# Patient Record
Sex: FEMALE | Race: WHITE | NOT HISPANIC OR LATINO | Employment: FULL TIME | URBAN - METROPOLITAN AREA
[De-identification: names, ages, dates, MRNs, and addresses within clinical notes are randomized per-mention and may not be internally consistent; named-entity substitution may affect disease eponyms.]

---

## 2021-10-07 ENCOUNTER — TELEPHONE (OUTPATIENT)
Dept: OBSTETRICS AND GYNECOLOGY | Facility: CLINIC | Age: 26
End: 2021-10-07

## 2021-10-08 ENCOUNTER — INITIAL PRENATAL (OUTPATIENT)
Dept: OBSTETRICS AND GYNECOLOGY | Facility: CLINIC | Age: 26
End: 2021-10-08
Payer: COMMERCIAL

## 2021-10-08 ENCOUNTER — PROCEDURE VISIT (OUTPATIENT)
Dept: OBSTETRICS AND GYNECOLOGY | Facility: CLINIC | Age: 26
End: 2021-10-08
Payer: COMMERCIAL

## 2021-10-08 DIAGNOSIS — Z34.80 SUPERVISION OF OTHER NORMAL PREGNANCY: Primary | ICD-10-CM

## 2021-10-08 DIAGNOSIS — E03.9 HYPOTHYROID IN PREGNANCY, ANTEPARTUM: ICD-10-CM

## 2021-10-08 DIAGNOSIS — Z36.2 ENCOUNTER FOR FOLLOW-UP ULTRASOUND OF FETAL ANATOMY: ICD-10-CM

## 2021-10-08 DIAGNOSIS — E03.9 HYPOTHYROIDISM, UNSPECIFIED TYPE: ICD-10-CM

## 2021-10-08 DIAGNOSIS — M41.9 SCOLIOSIS, UNSPECIFIED SCOLIOSIS TYPE, UNSPECIFIED SPINAL REGION: ICD-10-CM

## 2021-10-08 DIAGNOSIS — O99.280 HYPOTHYROID IN PREGNANCY, ANTEPARTUM: ICD-10-CM

## 2021-10-08 DIAGNOSIS — Z32.01 POSITIVE PREGNANCY TEST: ICD-10-CM

## 2021-10-08 DIAGNOSIS — Z36.2 ENCOUNTER FOR FOLLOW-UP ULTRASOUND OF FETAL ANATOMY: Primary | ICD-10-CM

## 2021-10-08 DIAGNOSIS — Z15.89 MTHFR GENE MUTATION: ICD-10-CM

## 2021-10-08 PROCEDURE — 87086 URINE CULTURE/COLONY COUNT: CPT | Performed by: ADVANCED PRACTICE MIDWIFE

## 2021-10-08 PROCEDURE — 0500F PR INITIAL PRENATAL CARE VISIT: ICD-10-PCS | Mod: CPTII,S$GLB,, | Performed by: ADVANCED PRACTICE MIDWIFE

## 2021-10-08 PROCEDURE — 87491 CHLMYD TRACH DNA AMP PROBE: CPT | Performed by: ADVANCED PRACTICE MIDWIFE

## 2021-10-08 PROCEDURE — 76816 OB US FOLLOW-UP PER FETUS: CPT | Mod: PBBFAC | Performed by: OBSTETRICS & GYNECOLOGY

## 2021-10-08 PROCEDURE — 0500F INITIAL PRENATAL CARE VISIT: CPT | Mod: CPTII,S$GLB,, | Performed by: ADVANCED PRACTICE MIDWIFE

## 2021-10-08 PROCEDURE — 99999 PR PBB SHADOW E&M-EST. PATIENT-LVL II: ICD-10-PCS | Mod: PBBFAC,,, | Performed by: ADVANCED PRACTICE MIDWIFE

## 2021-10-08 PROCEDURE — 87591 N.GONORRHOEAE DNA AMP PROB: CPT | Performed by: ADVANCED PRACTICE MIDWIFE

## 2021-10-08 PROCEDURE — 99999 PR PBB SHADOW E&M-EST. PATIENT-LVL II: CPT | Mod: PBBFAC,,, | Performed by: ADVANCED PRACTICE MIDWIFE

## 2021-10-08 RX ORDER — COVID-19 MOLECULAR TEST ASSAY
KIT MISCELLANEOUS
COMMUNITY
Start: 2021-07-28 | End: 2022-04-12

## 2021-10-08 RX ORDER — THYROID, PORCINE 300 MG/1
1 TABLET ORAL DAILY
COMMUNITY
Start: 2021-09-13 | End: 2021-11-11 | Stop reason: SDUPTHER

## 2021-10-10 LAB
BACTERIA UR CULT: NORMAL
BACTERIA UR CULT: NORMAL

## 2021-10-11 DIAGNOSIS — E03.9 HYPOTHYROID IN PREGNANCY, ANTEPARTUM: Primary | ICD-10-CM

## 2021-10-11 DIAGNOSIS — O99.280 HYPOTHYROID IN PREGNANCY, ANTEPARTUM: Primary | ICD-10-CM

## 2021-10-12 PROBLEM — Z34.80 SUPERVISION OF OTHER NORMAL PREGNANCY: Status: ACTIVE | Noted: 2021-10-12

## 2021-10-12 PROBLEM — M41.9 SCOLIOSIS DEFORMITY OF SPINE: Status: ACTIVE | Noted: 2021-10-12

## 2021-10-12 PROBLEM — F95.9 TIC DISORDER: Status: ACTIVE | Noted: 2021-10-12

## 2021-10-12 PROBLEM — Z15.89 MTHFR GENE MUTATION: Status: ACTIVE | Noted: 2021-10-12

## 2021-10-12 PROBLEM — L85.8 KERATOSIS PILARIS: Status: ACTIVE | Noted: 2021-10-12

## 2021-10-12 PROBLEM — E03.9 HYPOTHYROIDISM: Status: ACTIVE | Noted: 2021-10-12

## 2021-10-12 LAB
C TRACH DNA SPEC QL NAA+PROBE: NOT DETECTED
N GONORRHOEA DNA SPEC QL NAA+PROBE: NOT DETECTED

## 2021-10-13 ENCOUNTER — PATIENT MESSAGE (OUTPATIENT)
Dept: ADMINISTRATIVE | Facility: OTHER | Age: 26
End: 2021-10-13
Payer: COMMERCIAL

## 2021-10-14 VITALS
BODY MASS INDEX: 23.17 KG/M2 | WEIGHT: 125.88 LBS | DIASTOLIC BLOOD PRESSURE: 62 MMHG | SYSTOLIC BLOOD PRESSURE: 100 MMHG | HEIGHT: 62 IN

## 2021-10-25 ENCOUNTER — TELEPHONE (OUTPATIENT)
Dept: OBSTETRICS AND GYNECOLOGY | Facility: CLINIC | Age: 26
End: 2021-10-25
Payer: COMMERCIAL

## 2021-11-09 ENCOUNTER — TELEPHONE (OUTPATIENT)
Dept: OBSTETRICS AND GYNECOLOGY | Facility: CLINIC | Age: 26
End: 2021-11-09
Payer: COMMERCIAL

## 2021-11-09 ENCOUNTER — LAB VISIT (OUTPATIENT)
Dept: LAB | Facility: HOSPITAL | Age: 26
End: 2021-11-09
Attending: ADVANCED PRACTICE MIDWIFE
Payer: COMMERCIAL

## 2021-11-09 DIAGNOSIS — Z32.01 POSITIVE PREGNANCY TEST: ICD-10-CM

## 2021-11-09 PROCEDURE — 85025 COMPLETE CBC W/AUTO DIFF WBC: CPT | Performed by: ADVANCED PRACTICE MIDWIFE

## 2021-11-09 PROCEDURE — 80074 ACUTE HEPATITIS PANEL: CPT | Performed by: ADVANCED PRACTICE MIDWIFE

## 2021-11-09 PROCEDURE — 87389 HIV-1 AG W/HIV-1&-2 AB AG IA: CPT | Performed by: ADVANCED PRACTICE MIDWIFE

## 2021-11-09 PROCEDURE — 86592 SYPHILIS TEST NON-TREP QUAL: CPT | Performed by: ADVANCED PRACTICE MIDWIFE

## 2021-11-09 PROCEDURE — 83036 HEMOGLOBIN GLYCOSYLATED A1C: CPT | Performed by: ADVANCED PRACTICE MIDWIFE

## 2021-11-09 PROCEDURE — 86900 BLOOD TYPING SEROLOGIC ABO: CPT | Performed by: ADVANCED PRACTICE MIDWIFE

## 2021-11-09 PROCEDURE — 36415 COLL VENOUS BLD VENIPUNCTURE: CPT | Mod: PO | Performed by: ADVANCED PRACTICE MIDWIFE

## 2021-11-10 LAB
ABO + RH BLD: NORMAL
BASOPHILS # BLD AUTO: 0.03 K/UL (ref 0–0.2)
BASOPHILS NFR BLD: 0.3 % (ref 0–1.9)
BLD GP AB SCN CELLS X3 SERPL QL: NORMAL
DIFFERENTIAL METHOD: ABNORMAL
EOSINOPHIL # BLD AUTO: 0.2 K/UL (ref 0–0.5)
EOSINOPHIL NFR BLD: 1.9 % (ref 0–8)
ERYTHROCYTE [DISTWIDTH] IN BLOOD BY AUTOMATED COUNT: 12.4 % (ref 11.5–14.5)
ESTIMATED AVG GLUCOSE: 85 MG/DL (ref 68–131)
HBA1C MFR BLD: 4.6 % (ref 4–5.6)
HCT VFR BLD AUTO: 32 % (ref 37–48.5)
HGB BLD-MCNC: 10.3 G/DL (ref 12–16)
IMM GRANULOCYTES # BLD AUTO: 0.05 K/UL (ref 0–0.04)
IMM GRANULOCYTES NFR BLD AUTO: 0.5 % (ref 0–0.5)
LYMPHOCYTES # BLD AUTO: 1.7 K/UL (ref 1–4.8)
LYMPHOCYTES NFR BLD: 18.6 % (ref 18–48)
MCH RBC QN AUTO: 29.3 PG (ref 27–31)
MCHC RBC AUTO-ENTMCNC: 32.2 G/DL (ref 32–36)
MCV RBC AUTO: 91 FL (ref 82–98)
MONOCYTES # BLD AUTO: 0.6 K/UL (ref 0.3–1)
MONOCYTES NFR BLD: 6 % (ref 4–15)
NEUTROPHILS # BLD AUTO: 6.7 K/UL (ref 1.8–7.7)
NEUTROPHILS NFR BLD: 72.7 % (ref 38–73)
NRBC BLD-RTO: 0 /100 WBC
PLATELET # BLD AUTO: 276 K/UL (ref 150–450)
PMV BLD AUTO: 11.8 FL (ref 9.2–12.9)
RBC # BLD AUTO: 3.51 M/UL (ref 4–5.4)
WBC # BLD AUTO: 9.16 K/UL (ref 3.9–12.7)

## 2021-11-11 ENCOUNTER — OFFICE VISIT (OUTPATIENT)
Dept: OBSTETRICS AND GYNECOLOGY | Facility: CLINIC | Age: 26
End: 2021-11-11
Payer: COMMERCIAL

## 2021-11-11 DIAGNOSIS — E03.9 HYPOTHYROIDISM, UNSPECIFIED TYPE: ICD-10-CM

## 2021-11-11 DIAGNOSIS — Z15.89 MTHFR GENE MUTATION: ICD-10-CM

## 2021-11-11 DIAGNOSIS — Z34.80 SUPERVISION OF OTHER NORMAL PREGNANCY: Primary | ICD-10-CM

## 2021-11-11 LAB
HAV IGM SERPL QL IA: NEGATIVE
HBV CORE IGM SERPL QL IA: NEGATIVE
HBV SURFACE AG SERPL QL IA: NEGATIVE
HCV AB SERPL QL IA: NEGATIVE
HIV 1+2 AB+HIV1 P24 AG SERPL QL IA: NEGATIVE
RPR SER QL: NORMAL

## 2021-11-11 PROCEDURE — 0502F SUBSEQUENT PRENATAL CARE: CPT | Mod: CPTII,95,, | Performed by: ADVANCED PRACTICE MIDWIFE

## 2021-11-11 PROCEDURE — 3044F PR MOST RECENT HEMOGLOBIN A1C LEVEL <7.0%: ICD-10-PCS | Mod: CPTII,95,, | Performed by: ADVANCED PRACTICE MIDWIFE

## 2021-11-11 PROCEDURE — 0502F PR SUBSEQUENT PRENATAL CARE: ICD-10-PCS | Mod: CPTII,95,, | Performed by: ADVANCED PRACTICE MIDWIFE

## 2021-11-11 PROCEDURE — 3044F HG A1C LEVEL LT 7.0%: CPT | Mod: CPTII,95,, | Performed by: ADVANCED PRACTICE MIDWIFE

## 2021-11-11 RX ORDER — THYROID, PORCINE 300 MG/1
1 TABLET ORAL DAILY
Qty: 30 TABLET | Refills: 2 | Status: SHIPPED | OUTPATIENT
Start: 2021-11-11 | End: 2022-06-05 | Stop reason: SDUPTHER

## 2021-11-14 ENCOUNTER — PATIENT MESSAGE (OUTPATIENT)
Dept: OBSTETRICS AND GYNECOLOGY | Facility: CLINIC | Age: 26
End: 2021-11-14
Payer: COMMERCIAL

## 2021-12-01 ENCOUNTER — PATIENT MESSAGE (OUTPATIENT)
Dept: ADMINISTRATIVE | Facility: OTHER | Age: 26
End: 2021-12-01
Payer: COMMERCIAL

## 2021-12-02 ENCOUNTER — OFFICE VISIT (OUTPATIENT)
Dept: OBSTETRICS AND GYNECOLOGY | Facility: CLINIC | Age: 26
End: 2021-12-02
Payer: COMMERCIAL

## 2021-12-02 DIAGNOSIS — Z34.80 SUPERVISION OF OTHER NORMAL PREGNANCY: Primary | ICD-10-CM

## 2021-12-02 PROCEDURE — 0502F SUBSEQUENT PRENATAL CARE: CPT | Mod: CPTII,95,, | Performed by: ADVANCED PRACTICE MIDWIFE

## 2021-12-02 PROCEDURE — 0502F PR SUBSEQUENT PRENATAL CARE: ICD-10-PCS | Mod: CPTII,95,, | Performed by: ADVANCED PRACTICE MIDWIFE

## 2021-12-09 ENCOUNTER — PATIENT MESSAGE (OUTPATIENT)
Dept: OBSTETRICS AND GYNECOLOGY | Facility: CLINIC | Age: 26
End: 2021-12-09
Payer: COMMERCIAL

## 2021-12-10 ENCOUNTER — PATIENT MESSAGE (OUTPATIENT)
Dept: OBSTETRICS AND GYNECOLOGY | Facility: CLINIC | Age: 26
End: 2021-12-10
Payer: COMMERCIAL

## 2021-12-15 ENCOUNTER — OFFICE VISIT (OUTPATIENT)
Dept: OBSTETRICS AND GYNECOLOGY | Facility: CLINIC | Age: 26
End: 2021-12-15
Payer: COMMERCIAL

## 2021-12-15 DIAGNOSIS — E03.9 HYPOTHYROIDISM, UNSPECIFIED TYPE: ICD-10-CM

## 2021-12-15 DIAGNOSIS — Z36.89 ENCOUNTER FOR ULTRASOUND TO ASSESS FETAL GROWTH: ICD-10-CM

## 2021-12-15 DIAGNOSIS — Z15.89 MTHFR GENE MUTATION: ICD-10-CM

## 2021-12-15 DIAGNOSIS — Z34.80 SUPERVISION OF OTHER NORMAL PREGNANCY: Primary | ICD-10-CM

## 2021-12-15 PROCEDURE — 0502F SUBSEQUENT PRENATAL CARE: CPT | Mod: CPTII,95,, | Performed by: ADVANCED PRACTICE MIDWIFE

## 2021-12-15 PROCEDURE — 0502F PR SUBSEQUENT PRENATAL CARE: ICD-10-PCS | Mod: CPTII,95,, | Performed by: ADVANCED PRACTICE MIDWIFE

## 2021-12-18 LAB
25(OH)D3 SERPL-MCNC: 100 NG/ML (ref 30–100)
COPPER RBC-MCNC: 0.81 MG/L (ref 0.53–0.91)
MAGNESIUM RBC-MCNC: 3.9 MG/DL (ref 4–6.4)
ZINC RBC-MCNC: 12.14 MG/L (ref 9–14.7)

## 2021-12-23 ENCOUNTER — TELEPHONE (OUTPATIENT)
Dept: OBSTETRICS AND GYNECOLOGY | Facility: CLINIC | Age: 26
End: 2021-12-23
Payer: COMMERCIAL

## 2021-12-24 LAB — GLUCOSE SERPL-MCNC: 73 MG/DL (ref 65–99)

## 2021-12-29 ENCOUNTER — ROUTINE PRENATAL (OUTPATIENT)
Dept: OBSTETRICS AND GYNECOLOGY | Facility: CLINIC | Age: 26
End: 2021-12-29
Payer: COMMERCIAL

## 2021-12-29 ENCOUNTER — PROCEDURE VISIT (OUTPATIENT)
Dept: OBSTETRICS AND GYNECOLOGY | Facility: CLINIC | Age: 26
End: 2021-12-29
Payer: COMMERCIAL

## 2021-12-29 VITALS — DIASTOLIC BLOOD PRESSURE: 62 MMHG | SYSTOLIC BLOOD PRESSURE: 114 MMHG | WEIGHT: 132.5 LBS | BODY MASS INDEX: 24.23 KG/M2

## 2021-12-29 DIAGNOSIS — Z36.89 ENCOUNTER FOR ULTRASOUND TO ASSESS FETAL GROWTH: ICD-10-CM

## 2021-12-29 DIAGNOSIS — Z34.80 SUPERVISION OF OTHER NORMAL PREGNANCY: Primary | ICD-10-CM

## 2021-12-29 DIAGNOSIS — E03.9 HYPOTHYROIDISM, UNSPECIFIED TYPE: ICD-10-CM

## 2021-12-29 PROCEDURE — 0502F SUBSEQUENT PRENATAL CARE: CPT | Mod: CPTII,S$GLB,, | Performed by: ADVANCED PRACTICE MIDWIFE

## 2021-12-29 PROCEDURE — 87081 CULTURE SCREEN ONLY: CPT | Performed by: ADVANCED PRACTICE MIDWIFE

## 2021-12-29 PROCEDURE — 99999 PR PBB SHADOW E&M-EST. PATIENT-LVL II: ICD-10-PCS | Mod: PBBFAC,,, | Performed by: ADVANCED PRACTICE MIDWIFE

## 2021-12-29 PROCEDURE — 99999 PR PBB SHADOW E&M-EST. PATIENT-LVL II: CPT | Mod: PBBFAC,,, | Performed by: ADVANCED PRACTICE MIDWIFE

## 2021-12-29 PROCEDURE — 76816 US OB/GYN PROCEDURE (VIEWPOINT): ICD-10-PCS | Mod: S$GLB,,, | Performed by: OBSTETRICS & GYNECOLOGY

## 2021-12-29 PROCEDURE — 0502F PR SUBSEQUENT PRENATAL CARE: ICD-10-PCS | Mod: CPTII,S$GLB,, | Performed by: ADVANCED PRACTICE MIDWIFE

## 2021-12-29 PROCEDURE — 76816 OB US FOLLOW-UP PER FETUS: CPT | Mod: S$GLB,,, | Performed by: OBSTETRICS & GYNECOLOGY

## 2022-01-03 LAB — BACTERIA SPEC AEROBE CULT: NORMAL

## 2022-01-04 ENCOUNTER — PATIENT MESSAGE (OUTPATIENT)
Dept: OBSTETRICS AND GYNECOLOGY | Facility: CLINIC | Age: 27
End: 2022-01-04
Payer: COMMERCIAL

## 2022-01-11 ENCOUNTER — OFFICE VISIT (OUTPATIENT)
Dept: OBSTETRICS AND GYNECOLOGY | Facility: CLINIC | Age: 27
End: 2022-01-11
Payer: COMMERCIAL

## 2022-01-11 DIAGNOSIS — Z34.80 SUPERVISION OF OTHER NORMAL PREGNANCY: Primary | ICD-10-CM

## 2022-01-11 PROCEDURE — 0502F SUBSEQUENT PRENATAL CARE: CPT | Mod: CPTII,95,, | Performed by: ADVANCED PRACTICE MIDWIFE

## 2022-01-11 PROCEDURE — 0502F PR SUBSEQUENT PRENATAL CARE: ICD-10-PCS | Mod: CPTII,95,, | Performed by: ADVANCED PRACTICE MIDWIFE

## 2022-01-11 NOTE — PROGRESS NOTES
The patient location is: Work  The chief complaint leading to consultation is: Routine OB Care  Visit type: audiovisual  Face to Face time with patient: 15m  20 minutes of total time spent on the encounter, which includes face to face time and non-face to face time preparing to see the patient (eg, review of tests), Obtaining and/or reviewing separately obtained history, Documenting clinical information in the electronic or other health record, Independently interpreting results (not separately reported) and communicating results to the patient/family/caregiver, or Care coordination (not separately reported).  Each patient to whom he or she provides medical services by telemedicine is:  (1) informed of the relationship between the physician and patient and the respective role of any other health care provider with respect to management of the patient; and (2) notified that he or she may decline to receive medical services by telemedicine and may withdraw from such care at any time.  Notes:   26 y.o. female  at 37w0d by US  Doing well without concerns, getting over stomach bug  Discussed GBS negative results  Leave paperwork received and will give to Savita to fill out  Reviewed warning signs, normal FM/FKCs, labor precautions and how/when to call.  RTC x 1 wks, call or present sooner prn.

## 2022-01-13 ENCOUNTER — TELEPHONE (OUTPATIENT)
Dept: PEDIATRICS | Facility: CLINIC | Age: 27
End: 2022-01-13
Payer: COMMERCIAL

## 2022-01-20 ENCOUNTER — OFFICE VISIT (OUTPATIENT)
Dept: OBSTETRICS AND GYNECOLOGY | Facility: CLINIC | Age: 27
End: 2022-01-20
Payer: COMMERCIAL

## 2022-01-20 DIAGNOSIS — Z34.80 SUPERVISION OF OTHER NORMAL PREGNANCY: Primary | ICD-10-CM

## 2022-01-20 DIAGNOSIS — E03.9 HYPOTHYROIDISM, UNSPECIFIED TYPE: ICD-10-CM

## 2022-01-20 PROCEDURE — 0502F SUBSEQUENT PRENATAL CARE: CPT | Mod: CPTII,95,, | Performed by: ADVANCED PRACTICE MIDWIFE

## 2022-01-20 PROCEDURE — 0502F PR SUBSEQUENT PRENATAL CARE: ICD-10-PCS | Mod: CPTII,95,, | Performed by: ADVANCED PRACTICE MIDWIFE

## 2022-01-20 NOTE — PROGRESS NOTES
The patient location is: Work  The chief complaint leading to consultation is: Routine OB care  Visit type: audiovisual  Face to Face time with patient: 20min  25 minutes of total time spent on the encounter, which includes face to face time and non-face to face time preparing to see the patient (eg, review of tests), Obtaining and/or reviewing separately obtained history, Documenting clinical information in the electronic or other health record, Independently interpreting results (not separately reported) and communicating results to the patient/family/caregiver, or Care coordination (not separately reported).   Each patient to whom he or she provides medical services by telemedicine is:  (1) informed of the relationship between the physician and patient and the respective role of any other health care provider with respect to management of the patient; and (2) notified that he or she may decline to receive medical services by telemedicine and may withdraw from such care at any time.  Notes:   26 y.o. female  at 38w2d by US  Doing well without concerns, discussed how to prepare for labor and hospital stay   Next visit virtual and then 40wk with ultrasound and in person visit  Reviewed warning signs, normal FM/FKCs, labor precautions and how/when to call.  RTC x 1 wks, call or present sooner prn.

## 2022-01-27 ENCOUNTER — OFFICE VISIT (OUTPATIENT)
Dept: OBSTETRICS AND GYNECOLOGY | Facility: CLINIC | Age: 27
End: 2022-01-27
Payer: COMMERCIAL

## 2022-01-27 DIAGNOSIS — Z34.80 SUPERVISION OF OTHER NORMAL PREGNANCY: Primary | ICD-10-CM

## 2022-01-27 DIAGNOSIS — E03.9 HYPOTHYROIDISM, UNSPECIFIED TYPE: ICD-10-CM

## 2022-01-27 PROCEDURE — 0502F PR SUBSEQUENT PRENATAL CARE: ICD-10-PCS | Mod: CPTII,95,, | Performed by: ADVANCED PRACTICE MIDWIFE

## 2022-01-27 PROCEDURE — 0502F SUBSEQUENT PRENATAL CARE: CPT | Mod: CPTII,95,, | Performed by: ADVANCED PRACTICE MIDWIFE

## 2022-01-27 NOTE — PROGRESS NOTES
The patient location is: Work  The chief complaint leading to consultation is: Routine OB visit  Visit type: audiovisual  Face to Face time with patient: 10min  20 minutes of total time spent on the encounter, which includes face to face time and non-face to face time preparing to see the patient (eg, review of tests), Obtaining and/or reviewing separately obtained history, Documenting clinical information in the electronic or other health record, Independently interpreting results (not separately reported) and communicating results to the patient/family/caregiver, or Care coordination (not separately reported).   Each patient to whom he or she provides medical services by telemedicine is:  (1) informed of the relationship between the physician and patient and the respective role of any other health care provider with respect to management of the patient; and (2) notified that he or she may decline to receive medical services by telemedicine and may withdraw from such care at any time.  Notes:   26 y.o. female  at 39w2d by US  Doing well without concerns, did have a run of BHC while doing carpool earlier in the week  Her Mother has covid and she was feeling under the weather today, suggestions made  Reviewed warning signs, normal FM/FKCs, labor precautions and how/when to call.  RTC x 1 wks, call or present sooner prn.   Growth and repeat GBS to be performed at [facility]

## 2022-02-01 ENCOUNTER — PATIENT MESSAGE (OUTPATIENT)
Dept: OBSTETRICS AND GYNECOLOGY | Facility: CLINIC | Age: 27
End: 2022-02-01

## 2022-02-01 ENCOUNTER — ROUTINE PRENATAL (OUTPATIENT)
Dept: OBSTETRICS AND GYNECOLOGY | Facility: CLINIC | Age: 27
End: 2022-02-01
Payer: COMMERCIAL

## 2022-02-01 VITALS
BODY MASS INDEX: 24.92 KG/M2 | DIASTOLIC BLOOD PRESSURE: 78 MMHG | WEIGHT: 136.25 LBS | SYSTOLIC BLOOD PRESSURE: 118 MMHG

## 2022-02-01 DIAGNOSIS — E03.9 HYPOTHYROIDISM, UNSPECIFIED TYPE: ICD-10-CM

## 2022-02-01 DIAGNOSIS — Z34.80 SUPERVISION OF OTHER NORMAL PREGNANCY: Primary | ICD-10-CM

## 2022-02-01 PROCEDURE — 87081 CULTURE SCREEN ONLY: CPT | Performed by: ADVANCED PRACTICE MIDWIFE

## 2022-02-01 PROCEDURE — 99999 PR PBB SHADOW E&M-EST. PATIENT-LVL III: ICD-10-PCS | Mod: PBBFAC,,, | Performed by: ADVANCED PRACTICE MIDWIFE

## 2022-02-01 PROCEDURE — 99999 PR PBB SHADOW E&M-EST. PATIENT-LVL III: CPT | Mod: PBBFAC,,, | Performed by: ADVANCED PRACTICE MIDWIFE

## 2022-02-01 PROCEDURE — 0502F SUBSEQUENT PRENATAL CARE: CPT | Mod: CPTII,S$GLB,, | Performed by: ADVANCED PRACTICE MIDWIFE

## 2022-02-01 PROCEDURE — 0502F PR SUBSEQUENT PRENATAL CARE: ICD-10-PCS | Mod: CPTII,S$GLB,, | Performed by: ADVANCED PRACTICE MIDWIFE

## 2022-02-01 NOTE — PROGRESS NOTES
26 y.o. female  at 40w0d by US  Doing well without concerns, ready for baby, mother at visit with her  /78   Wt 61.8 kg (136 lb 3.9 oz)   BMI 24.92 kg/m²   TWG: 10 lbs   Repeat GBS swab today   VE declined  Ultrasound today with cephalic presentation, anterior/grade 2 placenta, OLIVE 14.4cm, MVP 7.3cm, BPP 8/8, EFW 87%, 8lb 13oz, male gender, suspected meconium filled amniotic fluid, reviewed with pt   Discussed IOL vs expectant management, pt would like to wait for labor  Reviewed warning signs, normal FM/FKCs, labor precautions and how/when to call.  RTC x beginning of next week with BPP, call or present sooner prn.

## 2022-02-03 ENCOUNTER — HOSPITAL ENCOUNTER (INPATIENT)
Facility: HOSPITAL | Age: 27
LOS: 3 days | Discharge: HOME | End: 2022-02-06
Attending: OBSTETRICS & GYNECOLOGY | Admitting: OBSTETRICS & GYNECOLOGY
Payer: COMMERCIAL

## 2022-02-03 ENCOUNTER — PATIENT MESSAGE (OUTPATIENT)
Dept: OBSTETRICS AND GYNECOLOGY | Facility: CLINIC | Age: 27
End: 2022-02-03
Payer: COMMERCIAL

## 2022-02-03 ENCOUNTER — ANESTHESIA (OUTPATIENT)
Dept: OBSTETRICS AND GYNECOLOGY | Facility: HOSPITAL | Age: 27
End: 2022-02-03
Payer: COMMERCIAL

## 2022-02-03 ENCOUNTER — ANESTHESIA EVENT (OUTPATIENT)
Dept: OBSTETRICS AND GYNECOLOGY | Facility: HOSPITAL | Age: 27
End: 2022-02-03
Payer: COMMERCIAL

## 2022-02-03 DIAGNOSIS — O42.90 AMNIOTIC FLUID LEAKING: ICD-10-CM

## 2022-02-03 DIAGNOSIS — Z37.9 VACUUM-ASSISTED VAGINAL DELIVERY: ICD-10-CM

## 2022-02-03 LAB
ABO + RH BLD: NORMAL
BASOPHILS # BLD AUTO: 0.05 K/UL (ref 0–0.2)
BASOPHILS NFR BLD: 0.6 % (ref 0–1.9)
BLD GP AB SCN CELLS X3 SERPL QL: NORMAL
DIFFERENTIAL METHOD: ABNORMAL
EOSINOPHIL # BLD AUTO: 0 K/UL (ref 0–0.5)
EOSINOPHIL NFR BLD: 0.3 % (ref 0–8)
ERYTHROCYTE [DISTWIDTH] IN BLOOD BY AUTOMATED COUNT: 13.4 % (ref 11.5–14.5)
HCT VFR BLD AUTO: 33.4 % (ref 37–48.5)
HGB BLD-MCNC: 11 G/DL (ref 12–16)
IMM GRANULOCYTES # BLD AUTO: 0.06 K/UL (ref 0–0.04)
IMM GRANULOCYTES NFR BLD AUTO: 0.7 % (ref 0–0.5)
LYMPHOCYTES # BLD AUTO: 2.6 K/UL (ref 1–4.8)
LYMPHOCYTES NFR BLD: 28.9 % (ref 18–48)
MCH RBC QN AUTO: 27 PG (ref 27–31)
MCHC RBC AUTO-ENTMCNC: 32.9 G/DL (ref 32–36)
MCV RBC AUTO: 82 FL (ref 82–98)
MONOCYTES # BLD AUTO: 0.7 K/UL (ref 0.3–1)
MONOCYTES NFR BLD: 8.2 % (ref 4–15)
NEUTROPHILS # BLD AUTO: 5.5 K/UL (ref 1.8–7.7)
NEUTROPHILS NFR BLD: 61.3 % (ref 38–73)
NRBC BLD-RTO: 0 /100 WBC
PLATELET # BLD AUTO: 231 K/UL (ref 150–450)
PMV BLD AUTO: 12.7 FL (ref 9.2–12.9)
RBC # BLD AUTO: 4.07 M/UL (ref 4–5.4)
SARS-COV-2 RDRP RESP QL NAA+PROBE: NEGATIVE
WBC # BLD AUTO: 8.97 K/UL (ref 3.9–12.7)

## 2022-02-03 PROCEDURE — 72100002 HC LABOR CARE, 1ST 8 HOURS

## 2022-02-03 PROCEDURE — 11000001 HC ACUTE MED/SURG PRIVATE ROOM

## 2022-02-03 PROCEDURE — 63600175 PHARM REV CODE 636 W HCPCS: Performed by: NURSE ANESTHETIST, CERTIFIED REGISTERED

## 2022-02-03 PROCEDURE — U0002 COVID-19 LAB TEST NON-CDC: HCPCS | Performed by: ADVANCED PRACTICE MIDWIFE

## 2022-02-03 PROCEDURE — 86850 RBC ANTIBODY SCREEN: CPT | Performed by: ADVANCED PRACTICE MIDWIFE

## 2022-02-03 PROCEDURE — 62326 NJX INTERLAMINAR LMBR/SAC: CPT | Performed by: NURSE ANESTHETIST, CERTIFIED REGISTERED

## 2022-02-03 PROCEDURE — 85025 COMPLETE CBC W/AUTO DIFF WBC: CPT | Performed by: ADVANCED PRACTICE MIDWIFE

## 2022-02-03 PROCEDURE — 25000003 PHARM REV CODE 250: Performed by: NURSE ANESTHETIST, CERTIFIED REGISTERED

## 2022-02-03 PROCEDURE — C1751 CATH, INF, PER/CENT/MIDLINE: HCPCS | Performed by: ANESTHESIOLOGY

## 2022-02-03 RX ORDER — LIDOCAINE HYDROCHLORIDE AND EPINEPHRINE 15; 5 MG/ML; UG/ML
INJECTION, SOLUTION EPIDURAL
Status: DISCONTINUED | OUTPATIENT
Start: 2022-02-03 | End: 2022-02-04

## 2022-02-03 RX ORDER — ROPIVACAINE HYDROCHLORIDE 2 MG/ML
INJECTION, SOLUTION EPIDURAL; INFILTRATION; PERINEURAL
Status: DISCONTINUED | OUTPATIENT
Start: 2022-02-03 | End: 2022-02-04

## 2022-02-03 RX ADMIN — ROPIVACAINE HYDROCHLORIDE 4 ML: 2 INJECTION, SOLUTION EPIDURAL; INFILTRATION at 11:02

## 2022-02-03 RX ADMIN — ROPIVACAINE HYDROCHLORIDE 14 ML/HR: 2 INJECTION, SOLUTION EPIDURAL; INFILTRATION at 11:02

## 2022-02-03 RX ADMIN — LIDOCAINE HYDROCHLORIDE,EPINEPHRINE BITARTRATE 3 ML: 15; .005 INJECTION, SOLUTION EPIDURAL; INFILTRATION; INTRACAUDAL; PERINEURAL at 11:02

## 2022-02-04 PROBLEM — Z37.9 VACUUM-ASSISTED VAGINAL DELIVERY: Status: ACTIVE | Noted: 2022-02-04

## 2022-02-04 PROBLEM — O42.90 AMNIOTIC FLUID LEAKING: Status: RESOLVED | Noted: 2022-02-04 | Resolved: 2022-02-04

## 2022-02-04 PROBLEM — Z34.80 SUPERVISION OF OTHER NORMAL PREGNANCY: Status: RESOLVED | Noted: 2021-10-12 | Resolved: 2022-02-04

## 2022-02-04 PROBLEM — O42.90 AMNIOTIC FLUID LEAKING: Status: ACTIVE | Noted: 2022-02-04

## 2022-02-04 LAB — BACTERIA SPEC AEROBE CULT: NORMAL

## 2022-02-04 PROCEDURE — 63600175 PHARM REV CODE 636 W HCPCS: Performed by: ADVANCED PRACTICE MIDWIFE

## 2022-02-04 PROCEDURE — 27201127 HC VACUUM EXTRACTOR

## 2022-02-04 PROCEDURE — 25000003 PHARM REV CODE 250: Performed by: MIDWIFE

## 2022-02-04 PROCEDURE — 63600175 PHARM REV CODE 636 W HCPCS: Performed by: OBSTETRICS & GYNECOLOGY

## 2022-02-04 PROCEDURE — 59400 OBSTETRICAL CARE: CPT | Mod: GB,,, | Performed by: OBSTETRICS & GYNECOLOGY

## 2022-02-04 PROCEDURE — 51701 INSERT BLADDER CATHETER: CPT

## 2022-02-04 PROCEDURE — 59400 PR FULL ROUT OBSTE CARE,VAGINAL DELIV: ICD-10-PCS | Mod: GB,,, | Performed by: OBSTETRICS & GYNECOLOGY

## 2022-02-04 PROCEDURE — 72100003 HC LABOR CARE, EA. ADDL. 8 HRS

## 2022-02-04 PROCEDURE — 86762 RUBELLA ANTIBODY: CPT | Performed by: ADVANCED PRACTICE MIDWIFE

## 2022-02-04 PROCEDURE — 63600175 PHARM REV CODE 636 W HCPCS: Performed by: MIDWIFE

## 2022-02-04 PROCEDURE — 72200006 HC VAGINAL DELIVERY LEVEL III

## 2022-02-04 PROCEDURE — 11000001 HC ACUTE MED/SURG PRIVATE ROOM

## 2022-02-04 RX ORDER — CALCIUM CARBONATE 200(500)MG
500 TABLET,CHEWABLE ORAL 3 TIMES DAILY PRN
Status: DISCONTINUED | OUTPATIENT
Start: 2022-02-04 | End: 2022-02-04

## 2022-02-04 RX ORDER — OXYTOCIN/RINGER'S LACTATE 30/500 ML
0-30 PLASTIC BAG, INJECTION (ML) INTRAVENOUS CONTINUOUS
Status: DISCONTINUED | OUTPATIENT
Start: 2022-02-04 | End: 2022-02-04

## 2022-02-04 RX ORDER — PROCHLORPERAZINE EDISYLATE 5 MG/ML
5 INJECTION INTRAMUSCULAR; INTRAVENOUS EVERY 6 HOURS PRN
Status: DISCONTINUED | OUTPATIENT
Start: 2022-02-04 | End: 2022-02-06 | Stop reason: HOSPADM

## 2022-02-04 RX ORDER — OXYTOCIN/RINGER'S LACTATE 30/500 ML
95 PLASTIC BAG, INJECTION (ML) INTRAVENOUS ONCE
Status: DISCONTINUED | OUTPATIENT
Start: 2022-02-04 | End: 2022-02-06 | Stop reason: HOSPADM

## 2022-02-04 RX ORDER — SODIUM CHLORIDE, SODIUM LACTATE, POTASSIUM CHLORIDE, CALCIUM CHLORIDE 600; 310; 30; 20 MG/100ML; MG/100ML; MG/100ML; MG/100ML
INJECTION, SOLUTION INTRAVENOUS CONTINUOUS
Status: DISCONTINUED | OUTPATIENT
Start: 2022-02-04 | End: 2022-02-04

## 2022-02-04 RX ORDER — TRANEXAMIC ACID 100 MG/ML
1000 INJECTION, SOLUTION INTRAVENOUS ONCE AS NEEDED
Status: DISCONTINUED | OUTPATIENT
Start: 2022-02-04 | End: 2022-02-04

## 2022-02-04 RX ORDER — ONDANSETRON 8 MG/1
8 TABLET, ORALLY DISINTEGRATING ORAL EVERY 8 HOURS PRN
Status: DISCONTINUED | OUTPATIENT
Start: 2022-02-04 | End: 2022-02-06 | Stop reason: HOSPADM

## 2022-02-04 RX ORDER — MISOPROSTOL 200 UG/1
800 TABLET ORAL
Status: DISCONTINUED | OUTPATIENT
Start: 2022-02-04 | End: 2022-02-06 | Stop reason: HOSPADM

## 2022-02-04 RX ORDER — OXYTOCIN/RINGER'S LACTATE 30/500 ML
334 PLASTIC BAG, INJECTION (ML) INTRAVENOUS ONCE
Status: DISCONTINUED | OUTPATIENT
Start: 2022-02-04 | End: 2022-02-04

## 2022-02-04 RX ORDER — OXYTOCIN 10 [USP'U]/ML
10 INJECTION, SOLUTION INTRAMUSCULAR; INTRAVENOUS ONCE
Status: COMPLETED | OUTPATIENT
Start: 2022-02-04 | End: 2022-02-04

## 2022-02-04 RX ORDER — DIPHENOXYLATE HYDROCHLORIDE AND ATROPINE SULFATE 2.5; .025 MG/1; MG/1
1 TABLET ORAL 4 TIMES DAILY PRN
Status: DISCONTINUED | OUTPATIENT
Start: 2022-02-04 | End: 2022-02-06 | Stop reason: HOSPADM

## 2022-02-04 RX ORDER — DIPHENHYDRAMINE HYDROCHLORIDE 50 MG/ML
25 INJECTION INTRAMUSCULAR; INTRAVENOUS EVERY 4 HOURS PRN
Status: DISCONTINUED | OUTPATIENT
Start: 2022-02-04 | End: 2022-02-06 | Stop reason: HOSPADM

## 2022-02-04 RX ORDER — PROCHLORPERAZINE EDISYLATE 5 MG/ML
5 INJECTION INTRAMUSCULAR; INTRAVENOUS EVERY 6 HOURS PRN
Status: DISCONTINUED | OUTPATIENT
Start: 2022-02-04 | End: 2022-02-04

## 2022-02-04 RX ORDER — HYDROCORTISONE 25 MG/G
CREAM TOPICAL 3 TIMES DAILY PRN
Status: DISCONTINUED | OUTPATIENT
Start: 2022-02-04 | End: 2022-02-06 | Stop reason: HOSPADM

## 2022-02-04 RX ORDER — PRENATAL WITH FERROUS FUM AND FOLIC ACID 3080; 920; 120; 400; 22; 1.84; 3; 20; 10; 1; 12; 200; 27; 25; 2 [IU]/1; [IU]/1; MG/1; [IU]/1; MG/1; MG/1; MG/1; MG/1; MG/1; MG/1; UG/1; MG/1; MG/1; MG/1; MG/1
1 TABLET ORAL DAILY
Status: DISCONTINUED | OUTPATIENT
Start: 2022-02-04 | End: 2022-02-06 | Stop reason: HOSPADM

## 2022-02-04 RX ORDER — SODIUM CHLORIDE 0.9 % (FLUSH) 0.9 %
10 SYRINGE (ML) INJECTION
Status: DISCONTINUED | OUTPATIENT
Start: 2022-02-04 | End: 2022-02-06 | Stop reason: HOSPADM

## 2022-02-04 RX ORDER — METHYLERGONOVINE MALEATE 0.2 MG/ML
200 INJECTION INTRAVENOUS
Status: DISCONTINUED | OUTPATIENT
Start: 2022-02-04 | End: 2022-02-06 | Stop reason: HOSPADM

## 2022-02-04 RX ORDER — ACETAMINOPHEN 325 MG/1
650 TABLET ORAL EVERY 6 HOURS PRN
Status: DISCONTINUED | OUTPATIENT
Start: 2022-02-04 | End: 2022-02-06 | Stop reason: HOSPADM

## 2022-02-04 RX ORDER — HYDROCODONE BITARTRATE AND ACETAMINOPHEN 5; 325 MG/1; MG/1
1 TABLET ORAL EVERY 4 HOURS PRN
Status: DISCONTINUED | OUTPATIENT
Start: 2022-02-04 | End: 2022-02-06 | Stop reason: HOSPADM

## 2022-02-04 RX ORDER — IBUPROFEN 600 MG/1
600 TABLET ORAL EVERY 6 HOURS
Status: DISCONTINUED | OUTPATIENT
Start: 2022-02-04 | End: 2022-02-06 | Stop reason: HOSPADM

## 2022-02-04 RX ORDER — OXYTOCIN/RINGER'S LACTATE 30/500 ML
95 PLASTIC BAG, INJECTION (ML) INTRAVENOUS ONCE
Status: DISCONTINUED | OUTPATIENT
Start: 2022-02-04 | End: 2022-02-04

## 2022-02-04 RX ORDER — SIMETHICONE 80 MG
1 TABLET,CHEWABLE ORAL 4 TIMES DAILY PRN
Status: DISCONTINUED | OUTPATIENT
Start: 2022-02-04 | End: 2022-02-04

## 2022-02-04 RX ORDER — ONDANSETRON 8 MG/1
8 TABLET, ORALLY DISINTEGRATING ORAL EVERY 8 HOURS PRN
Status: DISCONTINUED | OUTPATIENT
Start: 2022-02-04 | End: 2022-02-04

## 2022-02-04 RX ORDER — DOCUSATE SODIUM 100 MG/1
200 CAPSULE, LIQUID FILLED ORAL 2 TIMES DAILY PRN
Status: DISCONTINUED | OUTPATIENT
Start: 2022-02-04 | End: 2022-02-06 | Stop reason: HOSPADM

## 2022-02-04 RX ORDER — CARBOPROST TROMETHAMINE 250 UG/ML
250 INJECTION, SOLUTION INTRAMUSCULAR
Status: DISCONTINUED | OUTPATIENT
Start: 2022-02-04 | End: 2022-02-06 | Stop reason: HOSPADM

## 2022-02-04 RX ORDER — THYROID 30 MG/1
150 TABLET ORAL
Status: DISCONTINUED | OUTPATIENT
Start: 2022-02-05 | End: 2022-02-06 | Stop reason: HOSPADM

## 2022-02-04 RX ORDER — SIMETHICONE 80 MG
1 TABLET,CHEWABLE ORAL EVERY 6 HOURS PRN
Status: DISCONTINUED | OUTPATIENT
Start: 2022-02-04 | End: 2022-02-06 | Stop reason: HOSPADM

## 2022-02-04 RX ORDER — DIPHENHYDRAMINE HCL 25 MG
25 CAPSULE ORAL EVERY 4 HOURS PRN
Status: DISCONTINUED | OUTPATIENT
Start: 2022-02-04 | End: 2022-02-06 | Stop reason: HOSPADM

## 2022-02-04 RX ADMIN — Medication 2 MILLI-UNITS/MIN: at 07:02

## 2022-02-04 RX ADMIN — IBUPROFEN 600 MG: 600 TABLET, FILM COATED ORAL at 11:02

## 2022-02-04 RX ADMIN — HYDROCODONE BITARTRATE AND ACETAMINOPHEN 1 TABLET: 5; 325 TABLET ORAL at 08:02

## 2022-02-04 RX ADMIN — OXYTOCIN 10 UNITS: 10 INJECTION, SOLUTION INTRAMUSCULAR; INTRAVENOUS at 10:02

## 2022-02-04 RX ADMIN — SODIUM CHLORIDE, SODIUM LACTATE, POTASSIUM CHLORIDE, AND CALCIUM CHLORIDE: .6; .31; .03; .02 INJECTION, SOLUTION INTRAVENOUS at 07:02

## 2022-02-04 RX ADMIN — HYDROCODONE BITARTRATE AND ACETAMINOPHEN 1 TABLET: 5; 325 TABLET ORAL at 04:02

## 2022-02-04 RX ADMIN — IBUPROFEN 600 MG: 600 TABLET, FILM COATED ORAL at 05:02

## 2022-02-04 NOTE — LACTATION NOTE
This note was copied from a baby's chart.  Lactation Rounds.    Mother reclined with infant skin to skin on her chest, primary nurse at bedside for extended transition.  Infant has increased RR, is showing hunger cues.  Assisted mother to bring infant to L breast where infant mouths and licks and continues to show hunger cues.  Demo/return demo of breast shaping and optimal maternal hand positioning. Infant latches and sustains a nutritive pattern of sucking x60 seconds before resting at the breast.      Discussed early feeding cues and encouraged mother to feed baby in response to those cues. Encouraged unrestricted feedings rather than timed/amount limits, procedural schedules, or visitation schedules. Reviewed normal feeding expectations of 8 or more feedings per 24 hour period, cues that babies use to signal hunger and satiety, and the importance of physical contact during feeding. Advised that this will be dependent on infant condition.    Mother was taught hand expression of breastmilk/colostrum. She was instructed to:   Sit upright and lean forward, if possible.   When feasible, apply warm, wet compress over breasts for a few minutes.    Perform gentle breast massage.   Form a C with her hand and place it about 1 inch back from the areola with the nipple centered between her index finger and her thumb.   Press, compress, relax:  Using her finger and thumb, apply pressure in an inward direction toward the breast without stretching the tissue, compress the breast tissue between her finger and thumb, then relax her finger and thumb. Repeat process for a few minutes.   Rotate placement of finger and thumb on the breasts to facilitate emptying.   Collect expressed breastmilk/colostrum with a spoon or cup and feed immediately to the baby, if able.   If unable to feed immediately, place breastmilk/colostrum directly into a sterile storage container for later use. Place the babys breast milk label (with  the date and time of collection and the names of mother's medications) on the container. Reviewed proper handling and storage of expressed breastmilk.   Patient effectively return demonstrated and verbalized understanding.

## 2022-02-04 NOTE — ANESTHESIA PROCEDURE NOTES
Epidural    Patient location during procedure: OB   Reason for block: primary anesthetic   Reason for block: labor analgesia requested by patient and obstetrician  Diagnosis: IUP Labor   Start time: 2/3/2022 11:01 PM  Timeout: 2/3/2022 11:00 PM  End time: 2/3/2022 11:16 PM    Staffing  Performing Provider: Chris Wing CRNA  Authorizing Provider: Soila Liang MD        Preanesthetic Checklist  Completed: patient identified, IV checked, risks and benefits discussed, monitors and equipment checked, pre-op evaluation, timeout performed, anesthesia consent given, hand hygiene performed and patient being monitored  Preparation  Patient position: sitting  Prep: Betadine  Patient monitoring: Pulse Ox and Blood Pressure  Reason for block: primary anesthetic   Epidural  Skin Anesthetic: lidocaine 1%  Skin Wheal: 2 mL  Administration type: continuous  Approach: midline  Interspace: L3-4    Injection technique: TABITHA air  Needle and Epidural Catheter  Needle type: Tuohy   Needle gauge: 17  Needle length: 3.5 inches  Needle insertion depth: 4 cm  Catheter type: springwound and multi-orifice  Catheter size: 19 G  Insertion Attempts: 1  Test dose: 3 mL of lidocaine 1.5% with Epi 1-to-200,000  Additional Documentation: incremental injection, negative aspiration for heme and CSF, no paresthesia on injection, no signs/symptoms of IV or SA injection, no significant complaints from patient and no significant pain on injection  Needle localization: anatomical landmarks  Assessment  Ease of block: easy  Patient's tolerance of the procedure: comfortable throughout block and no complaintsNo inadvertent dural puncture with Tuohy.  Dural puncture not performed with spinal needle

## 2022-02-04 NOTE — ANESTHESIA POSTPROCEDURE EVALUATION
Anesthesia Post Evaluation    Patient: Marlene Garcia    Procedure(s) Performed: * No procedures listed *    Final Anesthesia Type: epidural      Patient location during evaluation: labor & delivery  Patient participation: Yes- Able to Participate  Level of consciousness: awake and alert and oriented  Post-procedure vital signs: reviewed and stable  Pain management: adequate  Airway patency: patent    PONV status at discharge: No PONV  Anesthetic complications: no      Cardiovascular status: blood pressure returned to baseline, stable and hemodynamically stable  Respiratory status: unassisted, room air and spontaneous ventilation  Hydration status: euvolemic  Follow-up not needed.          Vitals Value Taken Time   /69 02/04/22 1250   Temp 37.1 °C (98.7 °F) 02/04/22 1030   Pulse 114 02/04/22 1250   Resp 14 02/04/22 1405   SpO2 98 % 02/04/22 1249   Vitals shown include unvalidated device data.      No case tracking events are documented in the log.      Pain/Jaci Score: Pain Rating Prior to Med Admin: 4 (2/4/2022 11:43 AM)  Pain Rating Post Med Admin: 2 (2/4/2022 12:30 PM)  Jaci Score: 10 (2/4/2022 12:30 PM)

## 2022-02-04 NOTE — PLAN OF CARE
[facility] - Labor & Delivery  Discharge Assessment    Primary Care Provider: Primary Doctor No     OB Screen (most recent)       OB Screen - 22 1301          OB SCREEN    Assessment Type Discharge Planning Assessment (P)      Source of Information health record (P)      Received Prenatal Care Yes (P)      Any indications/suspicions for None (P)      Is this a teen pregnancy No (P)      Is the baby in NICU No (P)      Indication for adoption/[facility] No (P)      Indication for DME/post-acute needs No (P)      HIV (+) No (P)      Any congenital  disorders No (P)      Fetal demise/ death No (P)

## 2022-02-04 NOTE — H&P
[facility] - Labor & Delivery  Obstetrics  History & Physical    Patient Name: Marlene Garcia  MRN: [id number]  Admission Date: 2/3/2022  Primary Care Provider: Primary Doctor No    Subjective:     Principal Problem:Amniotic fluid leaking    History of Present Illness:  Presents with gross ROM      Obstetric HPI:  Patient reports  contractions, active fetal movement, No vaginal bleeding , Yes loss of fluid     This pregnancy has been complicated by post dates, hypothyroidism, MTHFR gene mutation, scoliosis    OB History    Para Term  AB Living   1 0 0 0 0 0   SAB IAB Ectopic Multiple Live Births   0 0 0 0 0      # Outcome Date GA Lbr Presley/2nd Weight Sex Delivery Anes PTL Lv   1 Current              Past Medical History:   Diagnosis Date    Thyroid disease      Past Surgical History:   Procedure Laterality Date    ORAL MUCOCELE EXCISION         PTA Medications   Medication Sig    ARMOUR THYROID 300 mg Tab Take 1 tablet by mouth once daily.    ID NOW COVID-19 TEST KIT Kit AS DIRECTED       Review of patient's allergies indicates:   Allergen Reactions    Pcn [penicillins]         Family History    None       Tobacco Use    Smoking status: Never Smoker    Smokeless tobacco: Never Used   Substance and Sexual Activity    Alcohol use: Not Currently    Drug use: Never    Sexual activity: Yes     Partners: Male     Birth control/protection: None     Review of Systems   Gastrointestinal: Positive for abdominal pain.   Genitourinary: Positive for vaginal discharge.        Clear amniotic fluid   All other systems reviewed and are negative.     Objective:     Vital Signs (Most Recent):  Pulse: 79 (22 0020)  BP: 115/74 (22 0020) Vital Signs (24h Range):  Pulse:  [] 79  BP: (111-126)/(56-97) 115/74        There is no height or weight on file to calculate BMI.    FHT: Cat 1 (reassuring)  TOCO:  Q 3-4  minutes    Physical Exam:   Constitutional: She is oriented to person, place, and  time. She appears well-developed and well-nourished.        Pulmonary/Chest: Effort normal.        Abdominal: Soft.     Genitourinary:    Vagina and uterus normal.             Musculoskeletal: Normal range of motion and moves all extremeties.       Neurological: She is alert and oriented to person, place, and time.    Skin: Skin is warm and dry.    Psychiatric: She has a normal mood and affect. Her behavior is normal. Judgment and thought content normal.       Cervix:  Dilation:  7  Effacement:  90  Station: -2  Presentation: Vertex     Significant Labs:  Lab Results   Component Value Date    GROUPTRH A POS 2022    HEPBSAG Negative 2021    STREPBCULT Normal cervicovaginal james present 2022       I have personallly reviewed all pertinent lab results from the last 24 hours.    Assessment/Plan:     26 y.o. female  at 40w3d for:    * Amniotic fluid leaking  Admit for labor management  Anticipate         Tigist Cleaning CNM  Obstetrics  [facility] - Labor & Delivery

## 2022-02-04 NOTE — ANESTHESIA PREPROCEDURE EVALUATION
02/03/2022  Marlene Garcia is a 26 y.o., female.    Anesthesia Evaluation    I have reviewed the Patient Summary Reports.    I have reviewed the Nursing Notes.    I have reviewed the Medications.     Review of Systems  Anesthesia Hx:  Denies Family Hx of Anesthesia complications.   Denies Personal Hx of Anesthesia complications.   Social:  Non-Smoker    Hematology/Oncology:  Hematology Normal   Oncology Normal     EENT/Dental:EENT/Dental Normal   Cardiovascular:  Cardiovascular Normal     Pulmonary:  Pulmonary Normal    Renal/:  Renal/ Normal     Hepatic/GI:  Hepatic/GI Normal    Musculoskeletal:   scoliosis   Neurological:  Neurology Normal    Endocrine:   Hypothyroidism    Psych:   Tic disorder         Physical Exam  General:  Well nourished    Airway/Jaw/Neck:  Airway Findings: Mouth Opening: Normal Tongue: Normal  General Airway Assessment: Adult  Mallampati: II  TM Distance: Normal, at least 6 cm      Dental:  Dental Findings: In tact        Mental Status:  Mental Status Findings:  Cooperative, Alert and Oriented         Anesthesia Plan  Type of Anesthesia, risks & benefits discussed:  Anesthesia Type:  epidural    Patient's Preference:   Plan Factors:          Intra-op Monitoring Plan:   Intra-op Monitoring Plan Comments:   Post Op Pain Control Plan:   Post Op Pain Control Plan Comments:     Induction:    Beta Blocker:         Informed Consent: Patient understands risks and agrees with Anesthesia plan.  Questions answered. Anesthesia consent signed with patient.  ASA Score: 2     Day of Surgery Review of History & Physical:            Ready For Surgery From Anesthesia Perspective.

## 2022-02-04 NOTE — SUBJECTIVE & OBJECTIVE
Interval History:  Marlene is a 26 y.o.  at 40w3d. She is doing well.     Objective:     Vital Signs (Most Recent):  Pulse: 80 (22)  BP: 109/78 (22) Vital Signs (24h Range):  Pulse:  [] 80  BP: ()/(51-97) 109/78        There is no height or weight on file to calculate BMI.    FHT: Cat 1 (reassuring)  TOCO:  Q 2.5-4 minutes    Cervical Exam:  Dilation:  10  Effacement:  100%  Station: 2  Presentation: Vertex     Significant Labs:  Lab Results   Component Value Date    GROUPTRH A POS 2022    HEPBSAG Negative 2021    STREPBCULT Normal cervicovaginal james present 2022       I have personallly reviewed all pertinent lab results from the last 24 hours.  Recent Lab Results       22        Baso #   0.05       Basophil %   0.6       Differential Method   Automated       Eos #   0.0       Eosinophil %   0.3       Gran # (ANC)   5.5       Gran %   61.3       Group & Rh   A POS       Hematocrit   33.4       Hemoglobin   11.0       Immature Grans (Abs)   0.06  Comment: Mild elevation in immature granulocytes is non specific and   can be seen in a variety of conditions including stress response,   acute inflammation, trauma and pregnancy. Correlation with other   laboratory and clinical findings is essential.         Immature Granulocytes   0.7       INDIRECT NICOLE   NEG       Lymph #   2.6       Lymph %   28.9       MCH   27.0       MCHC   32.9       MCV   82       Mono #   0.7       Mono %   8.2       MPV   12.7       nRBC   0       Platelets   231       RBC   4.07       RDW   13.4       SARS-CoV-2 RNA, Amplification, Qual Negative  Comment: This test utilizes isothermal nucleic acid amplification   technology to detect the SARS-CoV-2 RdRp nucleic acid segment.   The analytical sensitivity (limit of detection) is 125 genome   equivalents/mL.     A POSITIVE result implies infection with the SARS-CoV-2 virus;  the patient is presumed to be  contagious.    A NEGATIVE result means that SARS-CoV-2 nucleic acids are not  present above the limit of detection. A NEGATIVE result should be   treated as presumptive. It does not rule out the possibility of   COVID-19 and should not be the sole basis for treatment decisions.   If COVID-19 is strongly suspected based on clinical and exposure   history, re-testing using an alternate molecular assay should be   considered.       This test is only for use under the Food and Drug   Administration s Emergency Use Authorization (EUA).   Commercial kits are provided by [employer].   Performance characteristics of the EUA have been independently  verified by [facility] Department of  Pathology and Laboratory Medicine.   _________________________________________________________________  The ID NOW COVID-19 Letter of Authorization, along with the   authorized Fact Sheet for Healthcare Providers, the authorized Fact  Sheet for Patients, and authorized labeling are available on the FDA   website:  www.fda.gov/MedicalDevices/Safety/EmergencySituations/lio621499.htm           WBC   8.97             Physical Exam:   Constitutional: She is oriented to person, place, and time. She appears well-developed and well-nourished.    HENT:   Head: Normocephalic.       Pulmonary/Chest: Effort normal.        Abdominal: Soft.   gravid             Musculoskeletal: Normal range of motion.      Comments: ROM limited by PABLO       Neurological: She is alert and oriented to person, place, and time.    Skin: Skin is warm and dry. Capillary refill takes less than 2 seconds.    Psychiatric: She has a normal mood and affect. Her behavior is normal. Judgment and thought content normal.

## 2022-02-04 NOTE — L&D DELIVERY NOTE
[facility] - Labor & Delivery  Vaginal Delivery   Operative Note    SUMMARY     Vacuum assisted vaginal delivery of live infant, (use of mushroom x 2--1 pull with 2 contractions and bell--1 pull with 2 contractions--pop off when bell and mushroom reapplied; vacuum brought head to +3 ; vertex delivered under moms effort over 2nd degree tear.;  was placed on mothers abdomen for skin to skin and bulb suctioning performed.  Infant delivered position OA over 2nd degree tear.  Nuchal cord: x3;  Yes, cord reduced at perineum.    Spontaneous delivery of placenta and IM pitocin given noting good uterine tone.  2nd degree laceration noted and repaired in normal fashion with 2-0 vicrl reinforcement of capsule and 3-0 chromic.  Bilateral   Sulcal tears repaired with 3-0 chromic  Patient tolerated delivery well. Sponge needle and lap counted correctly x2.    Indications: Amniotic fluid leaking  Pregnancy complicated by:   Patient Active Problem List   Diagnosis    Tic disorder    Keratosis pilaris    Hypothyroidism    Supervision of other normal pregnancy    Scoliosis deformity of spine    MTHFR gene mutation    Amniotic fluid leaking     Admitting GA: 40w3d    Delivery Information for Demian Garcia    Birth information:  YOB: 2024   Time of birth: 10:03 AM   Sex: male   Head Delivery Date/Time: 2/4/2022 10:03 AM   Delivery type: Vaginal, Spontaneous   Gestational Age: 40w3d    Delivery Providers    Delivering clinician: Adrienne Siddiqui MD   Provider Role    Rose Chisholm, RN Registered Nurse    Jmaila Saavedra, RN Registered Nurse    Raul Portillo CNM Midwife    Karmen Rogers [facility]            Measurements    Weight:   Length:          Apgars    Living status:   Apgars:  1 min.:  5 min.:  10 min.:  15 min.:  20 min.:    Skin color:         Heart rate:         Reflex irritability:         Muscle tone:         Respiratory effort:         Total:                    Shoulder Dystocia     Shoulder dystocia present?: No           Presentation    Presentation: Vertex           Interventions/Resuscitation    Method: Bulb Suctioning, Tactile Stimulation, Deep Suctioning       Cord    Vessels: 3 vessels  Complications: Nuchal  Nuchal Intervention: reduced  Nuchal Cord Description: loose nuchal cord  Number of Loops: 3  Delayed Cord Clamping?: Yes  Cord Clamped Date/Time: 2022 10:05 AM  Cord Blood Disposition: Discarded  Gases Sent?: No  Stem Cell Collection (by MD): No       Placenta    Placenta delivery date/time:   Placenta removal:            Labor Events:       labor: No     Labor Onset Date/Time:         Dilation Complete Date/Time:         Start Pushing Date/Time:         Start Pushing Date/Time:       Rupture Date/Time: 22           Rupture type:          Fluid Amount:       Fluid Color: Clear      Fluid Odor:       Membrane Status: SRM (Spontaneous Rupture)               steroids: None     Antibiotics given for GBS: No     Induction: none     Indications for induction:        Augmentation:       Indications for augmentation:       Labor complications: None     Additional complications:          Cervical ripening:                     Delivery:      Episiotomy: None     Indication for Episiotomy:       Perineal Lacerations:   Repaired:      Periurethral Laceration:   Repaired:     Labial Laceration:   Repaired:     Sulcus Laceration:   Repaired:     Vaginal Laceration:   Repaired:     Cervical Laceration:   Repaired:     Repair suture:       Repair # of packets:       Last Value - EBL - Nursing (mL):       Sum - EBL - Nursing (mL): 0     Last Value - EBL - Anesthesia (mL):      Calculated QBL (mL):       Vaginal Sweep Performed:       Surgicount Correct:         Other providers:       Anesthesia    Method: Epidural          Details (if applicable):  Trial of Labor      Categorization:      Priority:     Indications for :     Incision  Type:       Additional  information:  Forceps:    Vacuum:    Breech:    Observed anomalies    Other (Comments):

## 2022-02-04 NOTE — PROGRESS NOTES
S: comfortable with epidural  O:  VS reviewed, afebrile   Vitals:    02/04/22 0405 02/04/22 0420 02/04/22 0435 02/04/22 0450   BP: 114/65 (!) 107/56 126/80 120/82   Pulse: 94  74 92       FHTs : CAT 1 reassuring, moderate variability  UC:  Q 3  SVE:  C/C/V/0    A: IUP @ 40w3d ;     Patient Active Problem List   Diagnosis    Tic disorder    Keratosis pilaris    Hypothyroidism    Supervision of other normal pregnancy    Scoliosis deformity of spine    MTHFR gene mutation    Amniotic fluid leaking       P: Post pushing x's 30 minutes with little progress, will labor down  Continue close monitoring of maternal/fetal status

## 2022-02-04 NOTE — PROGRESS NOTES
[facility]  Obstetrics  Labor Progress Note    Patient Name: Marlene Garcia  MRN: [id number]  Admission Date: 2/3/2022  Hospital Length of Stay: 1 days  Attending Physician: Sylvia Pratt MD  Primary Care Provider: Primary Doctor No    Subjective:     Principal Problem:Amniotic fluid leaking    Hospital Course:  Admit for labor management  PABLO in place  Pitocin augmentation      Interval History:  Marlene is a 26 y.o.  at 40w3d. She is doing well.     Objective:     Vital Signs (Most Recent):  Pulse: 80 (22)  BP: 109/78 (22) Vital Signs (24h Range):  Pulse:  [] 80  BP: ()/(51-97) 109/78        There is no height or weight on file to calculate BMI.    FHT: Cat 1 (reassuring)  TOCO:  Q 2.5-4 minutes    Cervical Exam:  Dilation:  10  Effacement:  100%  Station: 2  Presentation: Vertex     Significant Labs:  Lab Results   Component Value Date    GROUPTRH A POS 2022    HEPBSAG Negative 2021    STREPBCULT Normal cervicovaginal james present 2022       I have personallly reviewed all pertinent lab results from the last 24 hours.  Recent Lab Results       22        Baso #   0.05       Basophil %   0.6       Differential Method   Automated       Eos #   0.0       Eosinophil %   0.3       Gran # (ANC)   5.5       Gran %   61.3       Group & Rh   A POS       Hematocrit   33.4       Hemoglobin   11.0       Immature Grans (Abs)   0.06  Comment: Mild elevation in immature granulocytes is non specific and   can be seen in a variety of conditions including stress response,   acute inflammation, trauma and pregnancy. Correlation with other   laboratory and clinical findings is essential.         Immature Granulocytes   0.7       INDIRECT NICOLE   NEG       Lymph #   2.6       Lymph %   28.9       MCH   27.0       MCHC   32.9       MCV   82       Mono #   0.7       Mono %   8.2       MPV   12.7       nRBC   0        Platelets   231       RBC   4.07       RDW   13.4       SARS-CoV-2 RNA, Amplification, Qual Negative  Comment: This test utilizes isothermal nucleic acid amplification   technology to detect the SARS-CoV-2 RdRp nucleic acid segment.   The analytical sensitivity (limit of detection) is 125 genome   equivalents/mL.     A POSITIVE result implies infection with the SARS-CoV-2 virus;  the patient is presumed to be contagious.    A NEGATIVE result means that SARS-CoV-2 nucleic acids are not  present above the limit of detection. A NEGATIVE result should be   treated as presumptive. It does not rule out the possibility of   COVID-19 and should not be the sole basis for treatment decisions.   If COVID-19 is strongly suspected based on clinical and exposure   history, re-testing using an alternate molecular assay should be   considered.       This test is only for use under the Food and Drug   Administration s Emergency Use Authorization (EUA).   Commercial kits are provided by [employer].   Performance characteristics of the EUA have been independently  verified by [facility] Department of  Pathology and Laboratory Medicine.   _________________________________________________________________  The ID NOW COVID-19 Letter of Authorization, along with the   authorized Fact Sheet for Healthcare Providers, the authorized Fact  Sheet for Patients, and authorized labeling are available on the FDA   website:  www.fda.gov/MedicalDevices/Safety/EmergencySituations/siq721400.htm           WBC   8.97             Physical Exam:   Constitutional: She is oriented to person, place, and time. She appears well-developed and well-nourished.    HENT:   Head: Normocephalic.       Pulmonary/Chest: Effort normal.        Abdominal: Soft.   gravid             Musculoskeletal: Normal range of motion.      Comments: ROM limited by PABLO       Neurological: She is alert and oriented to person, place, and time.    Skin: Skin is warm  and dry. Capillary refill takes less than 2 seconds.    Psychiatric: She has a normal mood and affect. Her behavior is normal. Judgment and thought content normal.       Assessment/Plan:     26 y.o. female  at 40w3d for:    * Amniotic fluid leaking  Admit for labor management  Anticipate SUZANNE Portillo CNM  Obstetrics  [facility]

## 2022-02-04 NOTE — HOSPITAL COURSE
Admit for labor management  PABLO in place  Pitocin augmentation  2/6/22- PPD2, discharge instructions discussed.

## 2022-02-04 NOTE — NURSING
Assisted pt bathroom. Pt voided X 1. Ernestina care instructions given. Pt verbalized and demonstrated understanding.

## 2022-02-04 NOTE — SUBJECTIVE & OBJECTIVE
Obstetric HPI:  Patient reports  contractions, active fetal movement, No vaginal bleeding , Yes loss of fluid     This pregnancy has been complicated by post dates, hypothyroidism, MTHFR gene mutation, scoliosis    OB History    Para Term  AB Living   1 0 0 0 0 0   SAB IAB Ectopic Multiple Live Births   0 0 0 0 0      # Outcome Date GA Lbr Presley/2nd Weight Sex Delivery Anes PTL Lv   1 Current              Past Medical History:   Diagnosis Date    Thyroid disease      Past Surgical History:   Procedure Laterality Date    ORAL MUCOCELE EXCISION         PTA Medications   Medication Sig    ARMOUR THYROID 300 mg Tab Take 1 tablet by mouth once daily.    ID NOW COVID-19 TEST KIT Kit AS DIRECTED       Review of patient's allergies indicates:   Allergen Reactions    Pcn [penicillins]         Family History    None       Tobacco Use    Smoking status: Never Smoker    Smokeless tobacco: Never Used   Substance and Sexual Activity    Alcohol use: Not Currently    Drug use: Never    Sexual activity: Yes     Partners: Male     Birth control/protection: None     Review of Systems   Gastrointestinal: Positive for abdominal pain.   Genitourinary: Positive for vaginal discharge.        Clear amniotic fluid   All other systems reviewed and are negative.     Objective:     Vital Signs (Most Recent):  Pulse: 79 (22 0020)  BP: 115/74 (22 0020) Vital Signs (24h Range):  Pulse:  [] 79  BP: (111-126)/(56-97) 115/74        There is no height or weight on file to calculate BMI.    FHT: Cat 1 (reassuring)  TOCO:  Q 3-4  minutes    Physical Exam:   Constitutional: She is oriented to person, place, and time. She appears well-developed and well-nourished.        Pulmonary/Chest: Effort normal.        Abdominal: Soft.     Genitourinary:    Vagina and uterus normal.             Musculoskeletal: Normal range of motion and moves all extremeties.       Neurological: She is alert and oriented to person, place,  and time.    Skin: Skin is warm and dry.    Psychiatric: She has a normal mood and affect. Her behavior is normal. Judgment and thought content normal.       Cervix:  Dilation:  7  Effacement:  90  Station: -2  Presentation: Vertex     Significant Labs:  Lab Results   Component Value Date    GROUPTRH A POS 02/03/2022    HEPBSAG Negative 11/09/2021    STREPBCULT Normal cervicovaginal james present 02/01/2022       I have personallly reviewed all pertinent lab results from the last 24 hours.

## 2022-02-05 PROCEDURE — 25000003 PHARM REV CODE 250: Performed by: MIDWIFE

## 2022-02-05 PROCEDURE — 11000001 HC ACUTE MED/SURG PRIVATE ROOM

## 2022-02-05 RX ADMIN — THYROID, PORCINE 150 MG: 30 TABLET ORAL at 06:02

## 2022-02-05 RX ADMIN — HYDROCODONE BITARTRATE AND ACETAMINOPHEN 1 TABLET: 5; 325 TABLET ORAL at 01:02

## 2022-02-05 RX ADMIN — IBUPROFEN 600 MG: 600 TABLET, FILM COATED ORAL at 12:02

## 2022-02-05 RX ADMIN — HYDROCODONE BITARTRATE AND ACETAMINOPHEN 1 TABLET: 5; 325 TABLET ORAL at 10:02

## 2022-02-05 RX ADMIN — HYDROCODONE BITARTRATE AND ACETAMINOPHEN 1 TABLET: 5; 325 TABLET ORAL at 05:02

## 2022-02-05 RX ADMIN — IBUPROFEN 600 MG: 600 TABLET, FILM COATED ORAL at 05:02

## 2022-02-05 NOTE — LACTATION NOTE
This note was copied from a baby's chart.  Lactation Rounds.    Mother reclined in bed, infant on father's chest, showing hunger cues.   Mother has personal Spectra pump set up in her room.  She reports she used it overnight in addition to HE and syringe and finger fed baby.    Advised mother of infant hunger cues and assisted to position infant to R breast in clutch hold.  Demo/return demo of optimal maternal hand positioning; infant positioned close to mother's body.  After 2 attempts a deep and comfortable latch is achieved.  Rhythmic jaw movement is observed.    Recommended GHM: attaching your baby at the breast.    Ongoing education provided including correct positioning and latch, signs of an effective feeding, early feeding cues and baby-led feeds, frequency of feeds including the normality of cluster feeding, hand expression, exclusive breastfeeding for 6 months, as well as when and  how to seek the assistance of a qualified health care professional for concerns related to  feeding.

## 2022-02-05 NOTE — LACTATION NOTE
Called to assist with latch to breast.  Mother positions infant to L breast in cross cradle hold.  After 2 attempts infant achieves a deep latch.  Rhythmic jaw movement with appropriate pauses follows.  Mother has infant well positioned with head in sniffing position and infant body in full contact with mother.    Discussed possible cluster feeding tonight.  Encouraged parents to call for any assistance as desired.

## 2022-02-05 NOTE — NURSING
Educated mom on setting up her Spectra breast pump, [employer] Symphony breast pump set up at bedside.  Instructed on proper usage and to adjust suction according to comfort level.  Reviewed frequency and duration of pumping in order to promote and maintain full milk supply. Hands-on pumping technique reviewed. Encouraged hand expression after.  Reviewed proper milk handling, collection, storage, and transportation. Voices understanding. Mom shown how to use the football hold, baby sleepy , sucking only fort a few minutes at a time.

## 2022-02-05 NOTE — PLAN OF CARE
Patient afebrile this shift. Fundus firm without massage. Bleeding light, no clots passed this shift. Voids spontaneously. Ambulates independently. Pain well controlled with oral pain medication. Vital signs stable at this time. Bonding well with infant; responds to infant cues and participates in infant care. Will continue to monitor.

## 2022-02-06 VITALS
DIASTOLIC BLOOD PRESSURE: 76 MMHG | SYSTOLIC BLOOD PRESSURE: 140 MMHG | RESPIRATION RATE: 18 BRPM | HEART RATE: 99 BPM | TEMPERATURE: 98 F

## 2022-02-06 PROCEDURE — 25000003 PHARM REV CODE 250: Performed by: MIDWIFE

## 2022-02-06 RX ORDER — IBUPROFEN 600 MG/1
600 TABLET ORAL EVERY 6 HOURS PRN
Qty: 30 TABLET | Refills: 0 | Status: SHIPPED | OUTPATIENT
Start: 2022-02-06

## 2022-02-06 RX ORDER — HYDROCODONE BITARTRATE AND ACETAMINOPHEN 5; 325 MG/1; MG/1
1 TABLET ORAL EVERY 4 HOURS PRN
Qty: 15 TABLET | Refills: 0 | Status: SHIPPED | OUTPATIENT
Start: 2022-02-06 | End: 2022-04-12

## 2022-02-06 RX ADMIN — IBUPROFEN 600 MG: 600 TABLET, FILM COATED ORAL at 01:02

## 2022-02-06 RX ADMIN — IBUPROFEN 600 MG: 600 TABLET, FILM COATED ORAL at 06:02

## 2022-02-06 RX ADMIN — HYDROCODONE BITARTRATE AND ACETAMINOPHEN 1 TABLET: 5; 325 TABLET ORAL at 05:02

## 2022-02-06 RX ADMIN — HYDROCODONE BITARTRATE AND ACETAMINOPHEN 1 TABLET: 5; 325 TABLET ORAL at 10:02

## 2022-02-06 RX ADMIN — IBUPROFEN 600 MG: 600 TABLET, FILM COATED ORAL at 12:02

## 2022-02-06 NOTE — LACTATION NOTE
This note was copied from a baby's chart.  Lactation Rounds.    Mother reports nipple tenderness after cluster feeding overnight.  She reports she was able to independently latch baby to the breast and received assistance in optimal positioning.     Parents anticipate hospital discharge.  Breastfeeding discharge education performed. Informed mother of the World Health Organization's recommendation for exclusive breastfeeding for the first 6 months of baby's life and continued breastfeeding after the introduction of solid foods for 2 years and beyond. Also informed mother of the American Academy of Pediatrics' recommendation for baby to be examined by pediatrician or other qualified HCP within 2-4 days of discharge and again at the 2nd week of life. Discussed baby's appropriate intake and output, adequate weight gain patterns for baby, and how to seek the assistance of a qualified healthcare professional for concerns related to  feeding. Written instructions have been provided and were reviewed at this time. Mother voices understanding.      Advised of the lactation warm line and the availability of outpatient consults.        [facility] hosts a free, virtual support group that is open to all.   Trade breastfeeding tips, ask questions, and learn from other parents in the community.   , 2-4 PM   , 2-4 PM   , 2-4 PM    Join via Zoom!   Meeting ID:      Passcode:   [id number]

## 2022-02-06 NOTE — DISCHARGE INSTRUCTIONS
"Mother Self Care:    Activity: Avoid strenuous exercise and get adequate rest.  No driving until the physician consent given.  Emotional Changes: Most women find birth to be a time of great emotional upheaval.  Sense of loss, mood swings, fatigue, anxiety, and feeling "let down" are common.  If feelings worsen or last more than a week, call your physician.  Breast Care/Breastfeeding: Wear a bra for comfort.  Keep nipples dry and apply your own breast milk or lanolin cream as needed for soreness.  Engorgement can be relieved with warm, moist heat before feedings.  You may also take Ibuprofen.  Michelle-Care/Vaginal Bleeding: Remember to use your michelle-bottle after urinating.  Your flow will change from red, to pink, to yellow/white color over a period of 2 weeks.  Menstruation will return in 3-8 weeks, or longer if breastfeeding.  Episiotomy Vaginal Delivery: Stitches will dissolve within 10 days to 3 weeks.  Warm baths, tucks, and dermoplast will promote healing.  Avoid bubble baths or strong soaps.  Sexual Activity/Pelvic Rest: No sexual activity, tampons, or douching until your physician gives you consent.  Diet: Continue to eat from the five basic food groups, including plenty of protein, fruits, vegetables, and whole grains.  Limit empty calories and high fat foods.  Drink enough fluids to satisfy thirst and add an extra 500 calories for breastfeeding.  Constipation/Hemorrhoids: Drink plenty of water.  You may take a stool softener or natural laxative (Metamucil). You may use tucks or hemorrhoid ointment and soak in a warm tub.    CALL YOUR OB DOCTOR IF ANY OF THE FOLLOWING OCCURS:  *Heavy bleeding - saturating a pad an hour or passing any large (2-3 inches in size) blood clots.  *Any pain, redness, or tenderness in lower leg.  *You cannot care for yourself or your baby.  *Any signs of infection-      - Temperature greater than 100.5 degrees F      - Foul smelling vaginal discharge and/or incisional drainage      - " Increased episiotomy or incisional pain      - Hot, hard, red or sore area on breast      - Flu-like symptoms      - Any urgency, frequency or burning with urination    Return To the Hospital for further Evaluation:  · Headache not relieved by tylenol or ibuprofen  · Blurry vision, double vision, seeing spots, or flashing lights  · Feeling faint or passing out  · Right epigastric pain  · Difficulty breathing  · Swelling in hands, face, or feet  · Any of these symptoms accompanied by nausea/vomiting  · Gaining more than 5 pounds in one week  · Seizures  These symptoms could be an indication of elevated blood pressure.       If you have any questions that need to be answered immediately please call the Labor & Delivery Unit at [phone] and ask to speak to a nurse.

## 2022-02-06 NOTE — NURSING
Patient afebrile and had no falls this shift. Fundus firm without massage and below umbilicus. Bleeding light, no clots passed this shift. Voids spontaneously. Ambulates independently. Pain well controlled with oral pain medication. Vital signs stable at this time. Bonding well with infant; responds to infant cues and participates in infant care. Will continue to monitor.

## 2022-02-06 NOTE — DISCHARGE SUMMARY
[facility] ([facility])  Obstetrics  Discharge Summary      Patient Name: Marlene Garcia  MRN: [id number]  Admission Date: 2/3/2022  Hospital Length of Stay: 3 days  Discharge Date and Time:  02/06/2022 11:24 AM  Attending Physician: Sylvia Pratt MD   Discharging Provider: Sandhya Rey CNM   Primary Care Provider: Primary Doctor No    HPI: Presents with gross ROM          * No surgery found *     Hospital Course:   Admit for labor management  PABLO in place  Pitocin augmentation  2/6/22- PPD2, discharge instructions discussed.            Final Active Diagnoses:    Diagnosis Date Noted POA    PRINCIPAL PROBLEM:  Vacuum-assisted vaginal delivery [Z37.9] 02/04/2022 No    Delivery outcome of single liveborn infant [Z37.0] 02/04/2022 Not Applicable    Obstetrical laceration, second degree [O70.1] 02/04/2022 No      Problems Resolved During this Admission:    Diagnosis Date Noted Date Resolved POA    Amniotic fluid leaking [O42.90] 02/04/2022 02/04/2022 Yes        Significant Diagnostic Studies: Labs: All labs within the past 24 hours have been reviewed      Feeding Method: breast    Immunizations     Date Immunization Status Dose Route/Site Given by    02/04/22 1140 MMR Incomplete 0.5 mL Subcutaneous/     02/04/22 1140 Tdap Incomplete 0.5 mL Intramuscular/           Delivery:    Episiotomy: None   Lacerations: 2nd   Repair suture:     Repair # of packets: 2   Blood loss (ml):       Birth information:  YOB: 2024   Time of birth: 10:03 AM   Sex: male   Delivery type: Vaginal, Spontaneous   Gestational Age: 40w3d    Delivery Clinician:      Other providers:       Additional  information:  Forceps:    Vacuum:    Breech:    Observed anomalies      Living?:           APGARS  One minute Five minutes Ten minutes   Skin color:         Heart rate:         Grimace:         Muscle tone:         Breathing:         Totals: 8  8        Placenta: Delivered:       appearance    Pending  Diagnostic Studies:     Procedure Component Value Units Date/Time    Rubella antibody, IgG [[id number]] Collected: 02/04/22 0453    Order Status: Sent Lab Status: In process Updated: 02/04/22 0946    Specimen: Blood           Discharged Condition: good    Disposition: Home or Self Care    Follow Up:   Follow-up Information     Alberto Logan CNM. Go on 3/7/2022.    Specialty: Obstetrics and Gynecology  Why: postpartum follow up  Contact information:  [address]  [place] [address] [phone]                       Patient Instructions:      Diet Adult Regular     Pelvic Rest     Notify your health care provider if you experience any of the following:  temperature >100.4     Notify your health care provider if you experience any of the following:  persistent nausea and vomiting or diarrhea     Notify your health care provider if you experience any of the following:  severe uncontrolled pain     Notify your health care provider if you experience any of the following:  difficulty breathing or increased cough     Notify your health care provider if you experience any of the following:  severe persistent headache     Notify your health care provider if you experience any of the following:  persistent dizziness, light-headedness, or visual disturbances     Notify your health care provider if you experience any of the following:  increased confusion or weakness     Notify your health care provider if you experience any of the following:   Order Comments: Increased vaginal bleeding, dizziness, headache, blurred vision, and/or signs of postpartum depression     Medications:  Current Discharge Medication List      START taking these medications    Details   HYDROcodone-acetaminophen (NORCO) 5-325 mg per tablet Take 1 tablet by mouth every 4 (four) hours as needed for Pain.  Qty: 15 tablet, Refills: 0    Comments: Quantity prescribed more than 7 day supply? No      ibuprofen (ADVIL,MOTRIN) 600 MG tablet Take 1  tablet (600 mg total) by mouth every 6 (six) hours as needed for Pain.  Qty: 30 tablet, Refills: 0         CONTINUE these medications which have NOT CHANGED    Details   ARMOUR THYROID 300 mg Tab Take 1 tablet by mouth once daily.  Qty: 30 tablet, Refills: 2    Associated Diagnoses: Supervision of other normal pregnancy      ID NOW COVID-19 TEST KIT Kit AS DIRECTED             Sandhya Rey CNM  Obstetrics  [facility] ([facility])

## 2022-02-06 NOTE — NURSING
Discharge instructions given and reviewed with pt. Questions answered at this time. Pt verbalized understanding. Vss. Taken to car via wheelchair with infant in lap by staff. No distress noted.

## 2022-02-07 ENCOUNTER — TELEPHONE (OUTPATIENT)
Dept: PEDIATRICS | Facility: CLINIC | Age: 27
End: 2022-02-07
Payer: COMMERCIAL

## 2022-02-07 LAB
RUBV IGG SER-ACNC: 19.1 IU/ML
RUBV IGG SER-IMP: REACTIVE

## 2022-02-07 NOTE — TELEPHONE ENCOUNTER
LM- Dr. Eller out tomorrow (Tues) but pt should be seen Wed., at one week at the latest-by Friday.  visit is more than just weight- call back to schedule.     ----- Message from Troy Marshall MA sent at 2022  3:52 PM CST -----  Regarding:  visit  Contact: Pt [phone]  Just had a new baby on Friday, got discharged yesterday. Is supposed to come in tomorrow but states that she is in too much pain to come in. Knows that the first visit is for weighing the child, so she bought a scale and would rather send updates of the child or do visits as virtual visits. Wondering if Dr. Eller would be okay with that or would Dr. Eller rather wait until she is able to come in?

## 2022-02-15 ENCOUNTER — TELEPHONE (OUTPATIENT)
Dept: OBSTETRICS AND GYNECOLOGY | Facility: CLINIC | Age: 27
End: 2022-02-15
Payer: COMMERCIAL

## 2022-02-15 NOTE — TELEPHONE ENCOUNTER
----- Message from Harriett Gaston sent at 2/15/2022  1:34 PM CST -----  Contact: [phone] or [phone]  Patient would like to consult with a nurse in regards to her current symptoms she stated she has a fever and also burning when urinating . Please call back at [phone] or [phone] Thanks r/s

## 2022-02-16 ENCOUNTER — POSTPARTUM VISIT (OUTPATIENT)
Dept: OBSTETRICS AND GYNECOLOGY | Facility: CLINIC | Age: 27
End: 2022-02-16
Payer: COMMERCIAL

## 2022-02-16 VITALS
BODY MASS INDEX: 20.73 KG/M2 | SYSTOLIC BLOOD PRESSURE: 112 MMHG | DIASTOLIC BLOOD PRESSURE: 72 MMHG | HEIGHT: 62 IN | WEIGHT: 112.63 LBS

## 2022-02-16 DIAGNOSIS — R30.0 DYSURIA: Primary | ICD-10-CM

## 2022-02-16 DIAGNOSIS — R10.2 VAGINAL PAIN: ICD-10-CM

## 2022-02-16 PROCEDURE — 99999 PR PBB SHADOW E&M-EST. PATIENT-LVL III: CPT | Mod: PBBFAC,,, | Performed by: ADVANCED PRACTICE MIDWIFE

## 2022-02-16 PROCEDURE — 87088 URINE BACTERIA CULTURE: CPT | Performed by: ADVANCED PRACTICE MIDWIFE

## 2022-02-16 PROCEDURE — 99212 PR OFFICE/OUTPT VISIT, EST, LEVL II, 10-19 MIN: ICD-10-PCS | Mod: 24,25,S$GLB, | Performed by: ADVANCED PRACTICE MIDWIFE

## 2022-02-16 PROCEDURE — 1111F PR DISCHARGE MEDS RECONCILED W/ CURRENT OUTPATIENT MED LIST: ICD-10-PCS | Mod: CPTII,S$GLB,, | Performed by: ADVANCED PRACTICE MIDWIFE

## 2022-02-16 PROCEDURE — 99999 PR PBB SHADOW E&M-EST. PATIENT-LVL III: ICD-10-PCS | Mod: PBBFAC,,, | Performed by: ADVANCED PRACTICE MIDWIFE

## 2022-02-16 PROCEDURE — 1111F DSCHRG MED/CURRENT MED MERGE: CPT | Mod: CPTII,S$GLB,, | Performed by: ADVANCED PRACTICE MIDWIFE

## 2022-02-16 PROCEDURE — 87077 CULTURE AEROBIC IDENTIFY: CPT | Performed by: ADVANCED PRACTICE MIDWIFE

## 2022-02-16 PROCEDURE — 87186 SC STD MICRODIL/AGAR DIL: CPT | Performed by: ADVANCED PRACTICE MIDWIFE

## 2022-02-16 PROCEDURE — 99212 OFFICE O/P EST SF 10 MIN: CPT | Mod: 24,25,S$GLB, | Performed by: ADVANCED PRACTICE MIDWIFE

## 2022-02-16 PROCEDURE — 87086 URINE CULTURE/COLONY COUNT: CPT | Performed by: ADVANCED PRACTICE MIDWIFE

## 2022-02-17 NOTE — PROGRESS NOTES
"S: c/o pain in urethral area and when urinating x 5 days, some pain where she had stitches and possible boil  Reports low-grade fever and possible back pain   Remains exclusive breastfeeding baby boy  Denies depression S&S  With pt's mother and son at visit    O:   25y/o s/p vacuum assisted vaginal delivery with 2nd degree sulcus tear repaired by Dr. Siddiqui   /72   Ht 5' 2" (1.575 m)   Wt 51.1 kg (112 lb 10.5 oz)   BMI 20.60 kg/m²   Breastfeeding infant   8lb 4oz at birth on 2/4/2022  Upon inspection, normal PP discharge noted without odor  2nd degree with stitches noted, healing well   No irritation or redness around urethra  UA dip with + leukocytes and protein      A:   Suspected UTI, declines antibiotics at this time, will await culture results  2nd degree laceration healing well     P:   Urine culture  RTO x 2 weeks for PP visit  Continue to breastfeeding, suggestions made for better positioning of mother during feeds    "

## 2022-02-18 ENCOUNTER — TELEPHONE (OUTPATIENT)
Dept: OBSTETRICS AND GYNECOLOGY | Facility: CLINIC | Age: 27
End: 2022-02-18
Payer: COMMERCIAL

## 2022-02-18 LAB — BACTERIA UR CULT: ABNORMAL

## 2022-02-18 NOTE — TELEPHONE ENCOUNTER
----- Message from Isabell Katz sent at 2/18/2022  2:05 PM CST -----  Regarding: results  Contact: patient  Patient is requesting her lab results, please call her back at [phone]

## 2022-02-19 ENCOUNTER — PATIENT MESSAGE (OUTPATIENT)
Dept: OBSTETRICS AND GYNECOLOGY | Facility: CLINIC | Age: 27
End: 2022-02-19
Payer: COMMERCIAL

## 2022-02-20 DIAGNOSIS — N30.00 ACUTE CYSTITIS WITHOUT HEMATURIA: ICD-10-CM

## 2022-02-20 RX ORDER — NITROFURANTOIN 25; 75 MG/1; MG/1
100 CAPSULE ORAL 2 TIMES DAILY
Qty: 20 CAPSULE | Refills: 0 | Status: SHIPPED | OUTPATIENT
Start: 2022-02-20 | End: 2022-03-02

## 2022-02-28 ENCOUNTER — PATIENT MESSAGE (OUTPATIENT)
Dept: OBSTETRICS AND GYNECOLOGY | Facility: CLINIC | Age: 27
End: 2022-02-28
Payer: COMMERCIAL

## 2022-03-04 RX ORDER — FLUCONAZOLE 100 MG/1
100 TABLET ORAL DAILY
Qty: 3 TABLET | Refills: 0 | Status: SHIPPED | OUTPATIENT
Start: 2022-03-04 | End: 2022-03-07

## 2022-03-07 ENCOUNTER — POSTPARTUM VISIT (OUTPATIENT)
Dept: OBSTETRICS AND GYNECOLOGY | Facility: CLINIC | Age: 27
End: 2022-03-07
Payer: COMMERCIAL

## 2022-03-07 VITALS
SYSTOLIC BLOOD PRESSURE: 114 MMHG | WEIGHT: 111.75 LBS | HEIGHT: 62 IN | DIASTOLIC BLOOD PRESSURE: 77 MMHG | BODY MASS INDEX: 20.56 KG/M2

## 2022-03-07 DIAGNOSIS — N30.00 ACUTE CYSTITIS WITHOUT HEMATURIA: ICD-10-CM

## 2022-03-07 PROCEDURE — 0503F POSTPARTUM CARE VISIT: CPT | Mod: CPTII,S$GLB,, | Performed by: ADVANCED PRACTICE MIDWIFE

## 2022-03-07 PROCEDURE — 0503F PR POSTPARTUM CARE VISIT: ICD-10-PCS | Mod: CPTII,S$GLB,, | Performed by: ADVANCED PRACTICE MIDWIFE

## 2022-03-07 PROCEDURE — 87210 PR  SMEAR,STAIN,WET MNT,INTERP: ICD-10-PCS | Mod: QW,S$GLB,, | Performed by: ADVANCED PRACTICE MIDWIFE

## 2022-03-07 PROCEDURE — 99999 PR PBB SHADOW E&M-EST. PATIENT-LVL III: ICD-10-PCS | Mod: PBBFAC,,, | Performed by: ADVANCED PRACTICE MIDWIFE

## 2022-03-07 PROCEDURE — 87210 SMEAR WET MOUNT SALINE/INK: CPT | Mod: QW,S$GLB,, | Performed by: ADVANCED PRACTICE MIDWIFE

## 2022-03-07 PROCEDURE — 99999 PR PBB SHADOW E&M-EST. PATIENT-LVL III: CPT | Mod: PBBFAC,,, | Performed by: ADVANCED PRACTICE MIDWIFE

## 2022-03-08 PROBLEM — Z37.9 VACUUM-ASSISTED VAGINAL DELIVERY: Status: RESOLVED | Noted: 2022-02-04 | Resolved: 2022-03-08

## 2022-03-08 NOTE — PROGRESS NOTES
"Subjective:       Marlene Garcia is a 26 y.o. female who presents for a postpartum visit. She is 5 weeks postpartum following a spontaneous vaginal delivery. I have fully reviewed the prenatal and intrapartum course. The delivery was at 40.3 gestational weeks. Outcome: vacuum, outlet. Anesthesia: epidural. Postpartum course has been complicated by yeast, UTI and slowly healing 2nd degree laceration.  Reports she took the whole round of macrobid but dipped her urine at home and had dark line for bacteria. She has been using tea tree oil, drinking lots of water. Baby's course has been uncomplicated. Baby is feeding by breast. Bleeding staining only. Bowel function is normal. Bladder function is abnormal: dysuria. Patient is not sexually active. Contraception method is none. Postpartum depression screening: negative.    The following portions of the patient's history were reviewed and updated as appropriate: allergies, current medications, past family history, past medical history, past social history, past surgical history and problem list.    Review of Systems  Pertinent items are noted in HPI.     Objective:      /77   Ht 5' 2" (1.575 m)   Wt 50.7 kg (111 lb 12.4 oz)   Breastfeeding Yes   BMI 20.44 kg/m²    General:  alert, appears stated age and cooperative               Abdomen: normal findings: soft, non-tender    Vulva:  normal   Vagina: normal vagina, no discharge, exudate, lesion, or erythema    2nd degree laceration healing but still reddened with white scabs           Rectal Exam: Not performed.        Urine dip with:   Sp Grv: 1.005  PH: 6  Leucocytes: 1+  Nitrites: negative  Protein: trace  Glucose: negative  Ketones: negative  Urobil: negative  Bilirubin: negative  Blood: negative  Wet prep with no yeast buds seen.   Assessment:       Routine postpartum exam. Pap smear not done at today's visit.   Slow healing second degree sulcus laceration  S/p treated UTI  Plan:      1. " Contraception: none  2. Will send repeat urine culture  3. Follow up in: 1 year for Annual  or as needed.    4. Pt requesting longer time off from work until laceration healed and UTI healed, will re-do her leave paperwork.

## 2022-03-23 ENCOUNTER — TELEPHONE (OUTPATIENT)
Dept: OBSTETRICS AND GYNECOLOGY | Facility: CLINIC | Age: 27
End: 2022-03-23
Payer: COMMERCIAL

## 2022-03-23 NOTE — TELEPHONE ENCOUNTER
----- Message from Key Cardenas sent at 3/23/2022  1:58 PM CDT -----  Contact: Patient, [phone]  Calling to speak with the nurse. Please call her. Thanks.

## 2022-03-23 NOTE — TELEPHONE ENCOUNTER
Called patient and she is asking what charges are on her bill for cultures and sensitivity.  Patient also questioning why no urine culture results showing from 3/8.  Patient would not let me schedule her for another appointment until JA lets her know what that culture showed.    Patient also asking for extended leave past [facility] because she still has UTI.    Advised patient that message would be sent to provider.

## 2022-03-23 NOTE — TELEPHONE ENCOUNTER
----- Message from Linda Hendricks sent at 3/23/2022  2:58 PM CDT -----  Pt called in regards to her bill she received from her visit on 02/16/2022 , pt is wondering what are the other test that where done on her. Pt would also like to know about the culture for march 7 that's still pending . Pt needs to know when to schedule another appt to get checked the pt states she still have a UTI and yeast infection . She states she is needing her maternity leave extended she was told it would be after easter break when she can return back to work . Please give a call back at .[phone]   Thanks

## 2022-03-24 ENCOUNTER — TELEPHONE (OUTPATIENT)
Dept: OBSTETRICS AND GYNECOLOGY | Facility: CLINIC | Age: 27
End: 2022-03-24
Payer: COMMERCIAL

## 2022-03-24 NOTE — TELEPHONE ENCOUNTER
----- Message from Archana Reece sent at 3/24/2022 11:47 AM CDT -----  Contact: pt  Type:  Patient Returning Call    Who Called: pt  Who Left Message for Patient: unknown   Does the patient know what this is regarding? no  Would the patient rather a call back or a response via [facility]? Call back  Best Call Back Number: [phone]  Additional Information: n/a

## 2022-03-29 ENCOUNTER — POSTPARTUM VISIT (OUTPATIENT)
Dept: OBSTETRICS AND GYNECOLOGY | Facility: CLINIC | Age: 27
End: 2022-03-29
Payer: COMMERCIAL

## 2022-03-29 VITALS
HEIGHT: 62 IN | DIASTOLIC BLOOD PRESSURE: 74 MMHG | WEIGHT: 110.69 LBS | SYSTOLIC BLOOD PRESSURE: 116 MMHG | BODY MASS INDEX: 20.37 KG/M2

## 2022-03-29 DIAGNOSIS — N30.00 ACUTE CYSTITIS WITHOUT HEMATURIA: Primary | ICD-10-CM

## 2022-03-29 PROCEDURE — 99999 PR PBB SHADOW E&M-EST. PATIENT-LVL III: ICD-10-PCS | Mod: PBBFAC,,, | Performed by: ADVANCED PRACTICE MIDWIFE

## 2022-03-29 PROCEDURE — 87086 URINE CULTURE/COLONY COUNT: CPT | Performed by: ADVANCED PRACTICE MIDWIFE

## 2022-03-29 PROCEDURE — 99213 OFFICE O/P EST LOW 20 MIN: CPT | Mod: S$GLB,,, | Performed by: ADVANCED PRACTICE MIDWIFE

## 2022-03-29 PROCEDURE — 99213 PR OFFICE/OUTPT VISIT, EST, LEVL III, 20-29 MIN: ICD-10-PCS | Mod: S$GLB,,, | Performed by: ADVANCED PRACTICE MIDWIFE

## 2022-03-29 PROCEDURE — 99999 PR PBB SHADOW E&M-EST. PATIENT-LVL III: CPT | Mod: PBBFAC,,, | Performed by: ADVANCED PRACTICE MIDWIFE

## 2022-03-31 LAB
BACTERIA UR CULT: NORMAL
BACTERIA UR CULT: NORMAL

## 2022-04-02 NOTE — PROGRESS NOTES
Subjective:       Patient ID: Marlene Garcia is a 27 y.o. female.    Chief Complaint:  Postpartum Care      History of Present Illness  HPI  Pt reports painful urethra/clitoral area, believes she still has UTI has been using natural oils and taking natural medications to rid it    GYN & OB History  No LMP recorded.   Date of Last Pap: No result found    OB History    Para Term  AB Living   1 1 1     1   SAB IAB Ectopic Multiple Live Births         0 1      # Outcome Date GA Lbr Presley/2nd Weight Sex Delivery Anes PTL Lv   1 Term 22 40w3d  3.74 kg (8 lb 3.9 oz) M Vag-Spont EPI N NACHO       Review of Systems  Review of Systems   Genitourinary: Negative for vaginal bleeding and vaginal discharge.   All other systems reviewed and are negative.          Objective:    Physical Exam:   Constitutional: She is oriented to person, place, and time. She appears well-developed and well-nourished.    HENT:   Head: Normocephalic.      Cardiovascular: Normal rate.     Pulmonary/Chest: Effort normal.        Abdominal: Soft. There is no abdominal tenderness.     Genitourinary:                 Musculoskeletal: Normal range of motion and moves all extremeties.       Neurological: She is alert and oriented to person, place, and time.    Skin: Skin is warm and dry.    Psychiatric: She has a normal mood and affect. Her behavior is normal. Judgment and thought content normal.        urine dip wnl except trace leukocytes  Assessment:        1. Acute cystitis without hematuria                Plan:      Instructed patient to stop using natural remedies and stressed hydration, dial or dove soap during bathing  Urine culture sent

## 2022-04-07 ENCOUNTER — PATIENT MESSAGE (OUTPATIENT)
Dept: OBSTETRICS AND GYNECOLOGY | Facility: CLINIC | Age: 27
End: 2022-04-07
Payer: COMMERCIAL

## 2022-04-11 ENCOUNTER — TELEPHONE (OUTPATIENT)
Dept: LACTATION | Facility: CLINIC | Age: 27
End: 2022-04-11
Payer: COMMERCIAL

## 2022-04-11 ENCOUNTER — PATIENT MESSAGE (OUTPATIENT)
Dept: LACTATION | Facility: CLINIC | Age: 27
End: 2022-04-11
Payer: COMMERCIAL

## 2022-04-12 ENCOUNTER — POSTPARTUM VISIT (OUTPATIENT)
Dept: OBSTETRICS AND GYNECOLOGY | Facility: CLINIC | Age: 27
End: 2022-04-12
Payer: COMMERCIAL

## 2022-04-12 VITALS
BODY MASS INDEX: 20.4 KG/M2 | DIASTOLIC BLOOD PRESSURE: 64 MMHG | SYSTOLIC BLOOD PRESSURE: 110 MMHG | HEIGHT: 62 IN | WEIGHT: 110.88 LBS

## 2022-04-12 DIAGNOSIS — N89.8 VAGINAL DISCHARGE: Primary | ICD-10-CM

## 2022-04-12 PROCEDURE — 87481 CANDIDA DNA AMP PROBE: CPT | Mod: 59 | Performed by: ADVANCED PRACTICE MIDWIFE

## 2022-04-12 PROCEDURE — 99999 PR PBB SHADOW E&M-EST. PATIENT-LVL III: CPT | Mod: PBBFAC,,, | Performed by: ADVANCED PRACTICE MIDWIFE

## 2022-04-12 PROCEDURE — 99213 OFFICE O/P EST LOW 20 MIN: CPT | Mod: S$GLB,,, | Performed by: ADVANCED PRACTICE MIDWIFE

## 2022-04-12 PROCEDURE — 99213 PR OFFICE/OUTPT VISIT, EST, LEVL III, 20-29 MIN: ICD-10-PCS | Mod: S$GLB,,, | Performed by: ADVANCED PRACTICE MIDWIFE

## 2022-04-12 PROCEDURE — 99999 PR PBB SHADOW E&M-EST. PATIENT-LVL III: ICD-10-PCS | Mod: PBBFAC,,, | Performed by: ADVANCED PRACTICE MIDWIFE

## 2022-04-12 NOTE — PROGRESS NOTES
"S: Presents for f/u from urethra pain, reports that she has been treating a yeast infection with 7day monistat and has two days left, she reports that she is still having pulling pains from where her stitches are, she has been doing olive oil applications    OB History    Para Term  AB Living   1 1 1     1   SAB IAB Ectopic Multiple Live Births         0 1      # Outcome Date GA Lbr Presley/2nd Weight Sex Delivery Anes PTL Lv   1 Term 22 40w3d  3.74 kg (8 lb 3.9 oz) M Vag-Spont EPI N NACHO             Objective:    Physical Exam:               Genitourinary:                            /64   Ht 5' 2" (1.575 m)   Wt 50.3 kg (110 lb 14.3 oz)   BMI 20.28 kg/m²   1+ leukocytes all else negative via urine dip   Assessment:        1. Vaginal discharge            Plan:      Affirm collected and sent  Discussed normal healing of perineal repair, advised to not use any products besides the last two days of her monistat     "

## 2022-04-14 ENCOUNTER — PATIENT MESSAGE (OUTPATIENT)
Dept: OBSTETRICS AND GYNECOLOGY | Facility: CLINIC | Age: 27
End: 2022-04-14
Payer: COMMERCIAL

## 2022-04-14 LAB
BACTERIAL VAGINOSIS DNA: NEGATIVE
CANDIDA GLABRATA DNA: NEGATIVE
CANDIDA KRUSEI DNA: NEGATIVE
CANDIDA RRNA VAG QL PROBE: NEGATIVE
T VAGINALIS RRNA GENITAL QL PROBE: NEGATIVE

## 2022-04-27 ENCOUNTER — PATIENT MESSAGE (OUTPATIENT)
Dept: OBSTETRICS AND GYNECOLOGY | Facility: CLINIC | Age: 27
End: 2022-04-27
Payer: COMMERCIAL

## 2022-05-09 ENCOUNTER — PATIENT MESSAGE (OUTPATIENT)
Dept: OBSTETRICS AND GYNECOLOGY | Facility: CLINIC | Age: 27
End: 2022-05-09
Payer: COMMERCIAL

## 2022-07-16 ENCOUNTER — PATIENT MESSAGE (OUTPATIENT)
Dept: OBSTETRICS AND GYNECOLOGY | Facility: CLINIC | Age: 27
End: 2022-07-16
Payer: COMMERCIAL

## 2022-07-18 DIAGNOSIS — D64.9 ANEMIA, UNSPECIFIED TYPE: Primary | ICD-10-CM

## 2022-07-18 DIAGNOSIS — M62.89 PELVIC FLOOR DYSFUNCTION: Primary | ICD-10-CM

## 2022-07-25 ENCOUNTER — CLINICAL SUPPORT (OUTPATIENT)
Dept: REHABILITATION | Facility: HOSPITAL | Age: 27
End: 2022-07-25
Payer: COMMERCIAL

## 2022-07-25 DIAGNOSIS — M62.89 PELVIC FLOOR DYSFUNCTION: ICD-10-CM

## 2022-07-25 DIAGNOSIS — M62.89 PELVIC FLOOR DYSFUNCTION IN FEMALE: ICD-10-CM

## 2022-07-25 PROCEDURE — 97530 THERAPEUTIC ACTIVITIES: CPT | Mod: PN

## 2022-07-25 PROCEDURE — 97161 PT EVAL LOW COMPLEX 20 MIN: CPT | Mod: PN

## 2022-07-25 NOTE — PATIENT INSTRUCTIONS
Home Exercise Program: 07/25/2022    PERINEAL MASSAGE    1. Wash hands thoroughly with antibacterial soap.  2. Lay down comfortably with your back and head well supported by several pillows.  3. Apply water-based lubricant (ie. Slippery stuff, coconut oil, olive oil) on your thumbs and perineum.  4. Place one or both thumbs just inside the vagina.  5. Gently press downward, then slowly continue the stretch up both sides of the vaginal opening.   6. The amount of pressure for the stretch may cause discomfort, but not pain (ie. You can replicate the stretching sensation by bending your finger backward). Don't go above 4-5/10 pain during or after the exericse.  7. Focus on relaxed breathing and keeping the pelvic floor muscles dropped.   8. Continue for 5-10 minutes, 3-4 times per week.    STOP if there is increased bleeding, an infection, or extreme pain.         Some may prefer their partner to assist them or to perform the massage for them. Your partner needs to use clean hands and gently insert one or two index fingers inside the lower part of the vagina and follow the steps listed above. It is important to tell your partner how much pressure to apply without causing pain.     Home Program: 07/25/2022    YOGA POSES    These poses can be used to help improve pelvic floor muscle Drop/Relaxation.    Maintain each position for 1 minute, 1-2 times per day.       Deep Squat    Happy Baby            Child's Pose

## 2022-07-25 NOTE — PLAN OF CARE
"[facility]   Physical Therapy Initial Evaluation     Date: 2022   Name: Marlene Venegas [facility]  Clinic Number: [id number]    Therapy Diagnosis:   Encounter Diagnoses   Name Primary?    Pelvic floor dysfunction     Pelvic floor dysfunction in female      Physician: Krystal Sifuentes CNM    Physician Orders: PT Eval and Treat   Medical Diagnosis from Referral: M62.89 (ICD-10-CM) - Pelvic floor dysfunction  Evaluation Date: 2022  Authorization Period Expiration: 2023  Plan of Care Expiration: 10/25/2022  Progress Note Due: 2022  Visit # / Visits authorized:    FOTO: 1/3    Precautions: Standard     Time In: 12:08  Time Out: 1:03  Total Appointment Time (timed & untimed codes): 55 minutes    SUBJECTIVE     Date of onset: Patient reports getting a UTI from catheter in hospital after having her baby in February. She took an antibiotic for a while, but still had UTI. The antibiotic caused a yeast infection, itching, and "weirdness".     History of current condition - Melva reports: reports she has never had an orgasm before and clitoral stimulation is scary because of her yeast infection. Patient reports pain with intercourse and vaginal exams since having her child. Patient reports her child's birth was traumatic and remembers so many people vaginally examining her during delivery.      OB/GYN History: , vaginal delivery, perineal laceration, Vacuum-Assisted Vaginal Delivery, vaginal dryness, vaginal erythema, painful periods, painful vaginal penetration, pelvic pain and chronic yeast infections   Using vaginal estrogen cream: No  Sexually active? Yes- tried but  cant fit   Pain with vaginal exams, intercourse or tampon use? with intercourse, with vaginal exam and with tampon use    Bladder/Bowel History: trouble emptying bladder completely, recurrent bladder infections and constipation/straining for movement   Frequency of urination:   Daytime: every " 30 minutes to an hour            Nighttime: pee when she feeds him twice a night    Difficulty initiating urine stream: Yes   Urine stream: weak- strong    Complete emptying: No   Bladder leakage: No   Activities that cause leakage: none    Frequency of incidents: none    Urinary Urgency: Yes.  Able to delay the urge for at least 0-60 minute(s).   Pain with delaying the urge to urinate: No      Frequency of bowel movements: once a day   Difficulty initiating BM: Yes   Quality/Shape of BM: varies    Does Patient Feel Empty after BM? No   Bowel Urgency: No.  Able to delay the urge for at least 0-60 minute(s).   Fiber Supplements or Laxative Use? No, magnesium     Pain with BM: Yes if constpated     Bleeding with BM: No    Colon leakage: No    Pain:  Location: pelvic pain  Current 3/10, worst 5/10, best 0/10   Pelvic Pain Duration Occurs only during provocation  Location of Pelvic Pain: Introitus  Description: Burning and rawness   Aggravating Factors/Activities that cause symptoms: Vaginal exam/provocation and intercourse    Easing Factors: adjust underwear      Medical History: Melva  has a past medical history of Thyroid disease.     Surgical History: Marlene Garcia  has a past surgical history that includes Oral mucocele excision.    Medications: Marlene has a current medication list which includes the following prescription(s): armour thyroid and ibuprofen.    Allergies:   Review of patient's allergies indicates:   Allergen Reactions    Pcn [penicillins]         Prior Therapy/Previous treatment included: no   Social History: lives with family   Occupation: teacher   Prior Level of Function: Pt was independent with all ADLs and iADLs without pain, no reports of incontinence of bowel or bladder.  Current Level of Function: limited by pain and pelvic pressure     Pts goals: Patient reports she would like to get rid fo pain and be able to be intimate with her     OBJECTIVE      See EMR under MEDIA for written consent provided 7/25/2022  Chaperone: declined    ORTHO SCREEN  Posture in sitting: WNL  Posture in standing: WNL  Pelvic alignment: Not assessed today    Hip flexor and Adductor Palpation: tender and boggy    ABDOMINAL WALL ASSESSMENT  Diastasis: not assessed today      BREATHING MECHANICS ASSESSMENT   Thorax Assessment During Quiet Respiration: Decreased excursion of abdominal wall  and Decreased excursion bilaterally of lateral ribs   Thorax Assessment During Deep Respiration: Decreased excursion of abdominal wall  and Decreased excursion bilaterally of lateral ribs     VAGINAL PELVIC FLOOR EXAM    EXTERNAL ASSESSMENT  Introitus: stenotic  Skin condition: WNL  Scarring: did not formally assess today    Sensation: hypersensitivity noted   Pain: at bilateral labia majora   Voluntary contraction: minimal lift  Voluntary relaxation: minimal   Involuntary contraction: not assessed  Bearing down: visible lift  Perineal descent: absent      INTERNAL ASSESSMENT  Pain: patient  Only able to tolerated insertion of finger she is unable to tolerate movement of   Sensation: hypersensitivity   Vaginal vault: stenotic   Muscle Bulk: hypertonus   Muscle Power: 2/5  Muscle Endurance: 3 sec  # Reps To Fatigue: not assessed today     Fast Contractions in 10 seconds: not assessed today      Quality of contraction: incomplete relaxation   Specificity: patient contracts: adductors    Coordination: tends to hold breath during PFM contration   Prolapse check: Did not assess today   Does Pelvic Floor drop and relax with a diaphragmatic breath? no        TREATMENT     Treatment Time In: 12:53  Treatment Time Out: 1:03  Total Treatment time (time-based codes) separate from Evaluation: 10 minutes    Therapeutic Activity Patient participated in dynamic functional therapeutic activities to improve functional performance for 10 minutes. Including: Education as described below.     Patient Education  provided:   general anatomy/physiology of urinary/ bowel  system, benefits of treatment, risks of treatment and alternative methods of treatment was discussed with the pt. Additionally, anatomy/physiology of pelvic floor, posture/body mechanices, intercourse positions, dilator use, pelvic wand use, diaphragmatic breathing, kegels, perineal stretching/massage and behavior modifications was reviewed.     Home Exercises provided:  Written Home Exercises provided: yes.  Exercises were reviewed and Melva was able to demonstrate them prior to the end of the session.    Melva demonstrated good  understanding of the education provided.     See EMR under Patient Instructions for exercises provided 7/25/2022.    ASSESSMENT     Marlene is a 27 y.o. female referred to outpatient Physical Therapy with a medical diagnosis of M62.89 (ICD-10-CM) - Pelvic floor dysfunction . Pt presents with pelvic floor tenderness, adhered/painful perineal scar, decreased pelvic muscle strength, decreased endurance of the pelvic muscles, decreased phasic ability of the pelvic muscles, increased tension of the pelvic muscles and poor quality of pelvic muscle contraction.      Pt prognosis is Good.   Pt will benefit from skilled outpatient Physical Therapy to address the deficits stated above and in the chart below, provide pt/family education, and to maximize pt's level of independence.     Plan of care discussed with patient: Yes  Pt's spiritual, cultural and educational needs considered and patient is agreeable to the plan of care and goals as stated below:     Anticipated Barriers for therapy: none    Medical Necessity is demonstrated by the following:    History  Co-morbidities and personal factors that may impact the plan of care Co-morbidities   none    Personal Factors  no deficits     low   Examination  Body structures and functions, activity limitations and participation restrictions that may impact the plan of care Body  Regions/Systems/Functions:  pelvic floor tenderness, adhered/painful perineal scar, decreased pelvic muscle strength, decreased endurance of the pelvic muscles, decreased phasic ability of the pelvic muscles, increased tension of the pelvic muscles and poor quality of pelvic muscle contraction     Activity Limitations:  initiating a BM, full bladder emptying, intercourse/vaginal exam/tampon use without pain, Pain with ADLs and Participating in social activities and ADLs due to pelvic pressure    Participation Restrictions:  all ADLs/iADLs uninterrupted by discomfort associated with chronic constipation, ADLs affected by inability to fully empty bladder , relationship with spouse/partner and ADL participation affected by pain    Activity limitations:   Learning and applying knowledge  no deficits    General Tasks and Commands  no deficits    Communication  no deficits    Mobility  lifting and carrying objects    Self care  no deficits    Domestic Life  shopping  cooking  doing house work (cleaning house, washing dishes, laundry)    Interactions/Relationships  intimate relationships    Life Areas  employment    Community and Social Life  community life  recreation and leisure       moderate   Clinical Presentation stable and uncomplicated low   Decision Making/ Complexity Score: low       Goals:  Short Term Goals: 4 weeks   Pt to demonstrate proper diaphragmatic breathing to help with calming the nervous system in order to decrease pain and to improve abdominal wall musculature extensibility.   Pt to report being able to complete a half day at work without significant increase in pain to demonstrate a return towards prior level of function.  Pt to demonstrate good body mechanics when performing ADLs such as lifting and bending to decrease strain to lumbopelvic structures and reduce risk of further injury.      Long Term Goals: 12 weeks   Pt to be discharged with home plan for carry over after discharge.   Pt will be  trained and compliant with postural strategies in sitting and standing to improve alignment and decrease pain and muscle fatigue  Pt to report being able to complete a full day at work without significant increase in pain to demonstrate a return towards prior level of function.  Pt to report being able to have a comfortable vaginal exam without significant increase in pelvic pain.    PLAN   Plan of care Certification: 7/25/2022 to 10/25/2022.    Outpatient Physical Therapy 1 times weekly for 12 weeks to include the following interventions: therapeutic exercises, therapeutic activity, neuromuscular re-education, manual therapy, modalities PRN, patient/family education and self care/home management    Marquita Kyle, PT, DPT, PPCES      I CERTIFY THE NEED FOR THESE SERVICES FURNISHED UNDER THIS PLAN OF TREATMENT AND WHILE UNDER MY CARE   Physician's comments:     Physician's Signature: ___________________________________________________

## 2022-08-02 ENCOUNTER — PATIENT MESSAGE (OUTPATIENT)
Dept: OBSTETRICS AND GYNECOLOGY | Facility: CLINIC | Age: 27
End: 2022-08-02
Payer: COMMERCIAL

## 2022-08-02 NOTE — PROGRESS NOTES
"  Physical Therapy Daily Treatment Note     Name: Marlene Venegas [facility] Number: [id number]    Therapy Diagnosis:   Encounter Diagnosis   Name Primary?    Pelvic floor dysfunction in female Yes     Physician: Krystal Sifuentes CNM    Visit Date: 2022    Physician Orders: PT Eval and Treat   Medical Diagnosis from Referral: M62.89 (ICD-10-CM) - Pelvic floor dysfunction  Evaluation Date: 2022  Authorization Period Expiration: 2023  Plan of Care Expiration: 10/25/2022  Progress Note Due: 2022  Visit # / Visits authorized:    FOTO: 1/3     Precautions: Standard       Time In: 4:02  Time Out: 4:55  Total Billable Time: 53 minutes    Precautions: Standard    Subjective     Pt reports: she got to do the stretches once this week. Patient is busy with back to school task and other life stressors.   She was compliant with home exercise program.  Response to previous treatment: no change   Functional change: no change     Pain: 3/10  Location: pelvic pain    Constitutional Symptoms Review: The patient denies having any constitutional symptoms.     Objective   No intravaginal treatment today.  Signed consent form already on file.     Therapeutic Exercise to develop  strength, endurance, ROM, flexibility, posture and core stabilization for 30 minutes includin degree 1/2 kneel lung with PF relaxation 3 x 15"   Supine modified butterfly stretch x 1 min   Happy baby stretch x 1 min   Bridges 2 x 10   Alt hip abduction/adduction 10 x 10"  double knee to chest 10 x 10"     Ideas:  Sl reverse clams  Corner hamstring stretch  Corner hamstring stretch   Frog pose   Seated hip adductor/butterfly stretch   Deep squat PF stretch     Neuromuscular Re-education to develop Coordination and Down training for 3 minutes including:   diaphragmatic breathing     Manual Therapy to develop flexibility, extensibility and desensitization for 10 minutes including: trigger point/myofascial release of B hip " flexors and hip adductors     Therapeutic Activity Patient participated in dynamic functional therapeutic activities to improve functional performance for 10 minutes. Including: Education as described below.    Home Exercises Provided and Patient Education Provided     Education provided:   anatomy/physiology of pelvic floor, posture/body mechanices, diaphragmatic breathing, isometric abdominal exercises, perineal stretching/massage and behavior modifications  Discussed progression of plan of care with patient; educated pt in activity modification; reviewed HEP with pt. Pt demonstrated and verbalized understanding of all instruction and was provided with a handout of HEP (see Patient Instructions).    Written Home Exercises Provided: Patient instructed to cont prior HEP.  Exercises were reviewed and Melva was able to demonstrate them prior to the end of the session.  Melva demonstrated good  understanding of the education provided.     See EMR under Patient Instructions for exercises provided prior visit.    Assessment     Patient presents with continued pelvic pain. Patient was started on pelvic floor muscle strengthening and stretching for optional length tension relationship of muscles. Core and hip strengthening exercises were performed for increased postural stability to better support pelvic floor musculature. Patient tolerated all exercises well with no complaints of pain. Patient presents with overall muscle tightness and sensitivity to touch possible due to her anxious nature. Limited manual tolerance secondary to pain and hypersensitivity to pain. Patient reports many life stressors. Pt will continue to benefit from skilled outpatient physical therapy to address the deficits listed in the problem list box on initial evaluation, provide pt/family education and to maximize pt's level of independence in the home and community environment.     Melva Is progressing well towards her goals.   Pt prognosis is  Good.     Anticipated barriers to physical therapy: life stressors    Progress towards goals:  good    Goals:   Short Term Goals: 4 weeks   Pt to demonstrate proper diaphragmatic breathing to help with calming the nervous system in order to decrease pain and to improve abdominal wall musculature extensibility.   Pt to report being able to complete a half day at work without significant increase in pain to demonstrate a return towards prior level of function.  Pt to demonstrate good body mechanics when performing ADLs such as lifting and bending to decrease strain to lumbopelvic structures and reduce risk of further injury.        Long Term Goals: 12 weeks   Pt to be discharged with home plan for carry over after discharge.   Pt will be trained and compliant with postural strategies in sitting and standing to improve alignment and decrease pain and muscle fatigue  Pt to report being able to complete a full day at work without significant increase in pain to demonstrate a return towards prior level of function.  Pt to report being able to have a comfortable vaginal exam without significant increase in pelvic pain.      New/Revised goals: Continue with current established goals at this time.      Pt's spiritual, cultural and educational needs considered and pt agreeable to plan of care and goals.    Plan     Plan of care Certification: 7/25/2022 to 10/25/2022.     Continue with established Plan of Care, working toward established PT goals.    Marquita Kyle, PT, DPT, PPCES   8/4/2022

## 2022-08-04 ENCOUNTER — CLINICAL SUPPORT (OUTPATIENT)
Dept: REHABILITATION | Facility: HOSPITAL | Age: 27
End: 2022-08-04
Payer: COMMERCIAL

## 2022-08-04 DIAGNOSIS — M62.89 PELVIC FLOOR DYSFUNCTION IN FEMALE: Primary | ICD-10-CM

## 2022-08-04 PROCEDURE — 97140 MANUAL THERAPY 1/> REGIONS: CPT | Mod: PN

## 2022-08-04 PROCEDURE — 97530 THERAPEUTIC ACTIVITIES: CPT | Mod: PN

## 2022-08-04 PROCEDURE — 97110 THERAPEUTIC EXERCISES: CPT | Mod: PN

## 2022-08-09 ENCOUNTER — PATIENT MESSAGE (OUTPATIENT)
Dept: REHABILITATION | Facility: HOSPITAL | Age: 27
End: 2022-08-09

## 2022-08-10 ENCOUNTER — PATIENT MESSAGE (OUTPATIENT)
Dept: REHABILITATION | Facility: HOSPITAL | Age: 27
End: 2022-08-10
Payer: COMMERCIAL

## 2022-08-10 LAB
25(OH)D3 SERPL-MCNC: 94 NG/ML (ref 30–100)
BASOPHILS # BLD AUTO: 28 CELLS/UL (ref 0–200)
BASOPHILS NFR BLD AUTO: 0.5 %
BLASTS # BLD: NORMAL CELLS/UL
BLASTS NFR BLD MANUAL: NORMAL %
EOSINOPHIL # BLD AUTO: 132 CELLS/UL (ref 15–500)
EOSINOPHIL NFR BLD AUTO: 2.4 %
ERYTHROCYTE [DISTWIDTH] IN BLOOD BY AUTOMATED COUNT: 12.7 % (ref 11–15)
FERRITIN SERPL-MCNC: 23 NG/ML (ref 16–154)
HCT VFR BLD AUTO: 39.2 % (ref 35–45)
HGB BLD-MCNC: 12.9 G/DL (ref 11.7–15.5)
IRON SATN MFR SERPL: 43 % (CALC) (ref 16–45)
IRON SERPL-MCNC: 142 MCG/DL (ref 40–190)
LYMPHOCYTES # BLD AUTO: 2123 CELLS/UL (ref 850–3900)
LYMPHOCYTES NFR BLD AUTO: 38.6 %
MCH RBC QN AUTO: 27.8 PG (ref 27–33)
MCHC RBC AUTO-ENTMCNC: 32.9 G/DL (ref 32–36)
MCV RBC AUTO: 84.5 FL (ref 80–100)
METAMYELOCYTES # BLD: NORMAL CELLS/UL
METAMYELOCYTES NFR BLD MANUAL: NORMAL %
MONOCYTES # BLD AUTO: 303 CELLS/UL (ref 200–950)
MONOCYTES NFR BLD AUTO: 5.5 %
MYELOCYTES # BLD: NORMAL CELLS/UL
MYELOCYTES NFR BLD MANUAL: NORMAL %
NEUTROPHILS # BLD AUTO: 2915 CELLS/UL (ref 1500–7800)
NEUTROPHILS NFR BLD AUTO: 53 %
NEUTS BAND # BLD: NORMAL CELLS/UL (ref 0–750)
NEUTS BAND NFR BLD MANUAL: NORMAL %
NRBC # BLD: NORMAL CELLS/UL
NRBC BLD-RTO: NORMAL /100 WBC
PLATELET # BLD AUTO: 269 THOUSAND/UL (ref 140–400)
PMV BLD REES-ECKER: 10.9 FL (ref 7.5–12.5)
PROMYELOCYTES # BLD: NORMAL CELLS/UL
PROMYELOCYTES NFR BLD MANUAL: NORMAL %
RBC # BLD AUTO: 4.64 MILLION/UL (ref 3.8–5.1)
SERVICE CMNT-IMP: NORMAL
T3FREE SERPL-MCNC: 4 PG/ML (ref 2.3–4.2)
T3REVERSE SERPL-MCNC: 21 NG/DL (ref 8–25)
T4 FREE SERPL-MCNC: 1.4 NG/DL (ref 0.8–1.8)
TIBC SERPL-MCNC: 331 MCG/DL (CALC) (ref 250–450)
VARIANT LYMPHS NFR BLD: NORMAL % (ref 0–10)
WBC # BLD AUTO: 5.5 THOUSAND/UL (ref 3.8–10.8)

## 2022-08-17 NOTE — PROGRESS NOTES
"  Physical Therapy Daily Treatment Note     Name: Marlene Venegas [facility] Number: [id number]    Therapy Diagnosis:   Encounter Diagnosis   Name Primary?    Pelvic floor dysfunction in female Yes     Physician: Krystal Sifuentes CNM    Visit Date: 2022    Physician Orders: PT Eval and Treat   Medical Diagnosis from Referral: M62.89 (ICD-10-CM) - Pelvic floor dysfunction  Evaluation Date: 2022  Authorization Period Expiration: 2023  Plan of Care Expiration: 10/25/2022  Progress Note Due: 2022  Visit # / Visits authorized:    FOTO: 1/3     Precautions: Standard       Time In: 4:00  Time Out: 4:45  Total Billable Time: 45 minutes    Precautions: Standard    Subjective     Pt reports: continued pelvic pain and stress from back to school. Patient reports feeling groin tightness when having to run to hand a coworker something. Patient reports she thinks she is holding tension and stress in her pelvis. Patient reports her and her  attempted to be intimate, but without penetration and it just made her raw. Patient asked about products to help prevent vulvar moisture/chaffing.   She was not compliant with home exercise program. Patient reports not performing any stretches or perineal massage this week. Patient reports feeling uncomfortable trying to perineal massage herself. See education.   Response to previous treatment: no change   Functional change: no change     Pain: 3/10  Location: pelvic pain    Constitutional Symptoms Review: The patient denies having any constitutional symptoms.     Objective   No intravaginal treatment today.  Signed consent form already on file.   (exercises performed today are bolded)      Therapeutic Exercise to develop  strength, endurance, ROM, flexibility, posture and core stabilization for 15 minutes includin degree 1/2 kneel lung with PF relaxation 3 x 15"   Supine modified butterfly stretch x 1 min   Happy baby stretch x 1 min   Bridges 2 " "x 10   + pelvic circles on physio ball for postural stability and  desensitization of vulva   Alt hip abduction/adduction 10 x 10"   double knee to chest 10 x 10"     Ideas:  Sl reverse clams  Corner hamstring stretch  Corner hamstring stretch   Frog pose   Seated hip adductor/butterfly stretch   Deep squat PF stretch     Neuromuscular Re-education to develop Coordination and Down training for 0 minutes including:   diaphragmatic breathing     Manual Therapy to develop flexibility, extensibility and desensitization for 0 minutes including: trigger point/myofascial release of B hip flexors and hip adductors     Therapeutic Activity Patient participated in dynamic functional therapeutic activities to improve functional performance for 30 minutes. Including: Education as described below.    Home Exercises Provided and Patient Education Provided     Education provided:   anatomy/physiology of pelvic floor, posture/body mechanices, vulvar irritants, hygiene of pelvic floor, dilator use, pelvic wand use, hygiene of perineum, perineal stretching/massage and behavior modifications   -modeled perineal massage with pelvic model   -discussed vulvar care and lubricants   -dicussed use of dilators or wands to make patient more comfortable performing internal stretches at home  -discussed importance of HEP compliance to reach her goals  -discussed possible sex therapist referral    Discussed progression of plan of care with patient; educated pt in activity modification; reviewed HEP with pt. Pt demonstrated and verbalized understanding of all instruction and was provided with a handout of HEP (see Patient Instructions).    Written Home Exercises Provided: yes.  Exercises were reviewed and Melva was able to demonstrate them prior to the end of the session.  Melva demonstrated good  understanding of the education provided.     See EMR under Patient Instructions for exercises provided 8/19/2022.    Assessment     Patient presents " with continued pelvic pain. Patient reports decreased HEP compliance and hesitancy performing internal stretches. The major focus of today was education on treatment plan, importance of HEP compliance, and internal stretching to reach ultimate goal. Also reviewed vulvar care and perineal hygiene. Patient was able to tolerated vulvar pressure from physio ball with no increase in pain. Patient tolerated all exercises well with only minimal discomfort from clothes touch vulva. Pt will continue to benefit from skilled outpatient physical therapy to address the deficits listed in the problem list box on initial evaluation, provide pt/family education and to maximize pt's level of independence in the home and community environment.     Melva Is progressing well towards her goals.   Pt prognosis is Good.     Anticipated barriers to physical therapy: life stressors    Progress towards goals:  good    Goals:   Short Term Goals: 4 weeks   Pt to demonstrate proper diaphragmatic breathing to help with calming the nervous system in order to decrease pain and to improve abdominal wall musculature extensibility.   Pt to report being able to complete a half day at work without significant increase in pain to demonstrate a return towards prior level of function.  Pt to demonstrate good body mechanics when performing ADLs such as lifting and bending to decrease strain to lumbopelvic structures and reduce risk of further injury.        Long Term Goals: 12 weeks   Pt to be discharged with home plan for carry over after discharge.   Pt will be trained and compliant with postural strategies in sitting and standing to improve alignment and decrease pain and muscle fatigue  Pt to report being able to complete a full day at work without significant increase in pain to demonstrate a return towards prior level of function.  Pt to report being able to have a comfortable vaginal exam without significant increase in pelvic  pain.      New/Revised goals: Continue with current established goals at this time.      Pt's spiritual, cultural and educational needs considered and pt agreeable to plan of care and goals.    Plan     Plan of care Certification: 7/25/2022 to 10/25/2022.     Continue with established Plan of Care, working toward established PT goals.    Marquita Kyle, PT, DPT, PPCES   08/19/2022

## 2022-08-18 ENCOUNTER — CLINICAL SUPPORT (OUTPATIENT)
Dept: REHABILITATION | Facility: HOSPITAL | Age: 27
End: 2022-08-18
Payer: COMMERCIAL

## 2022-08-18 DIAGNOSIS — M62.89 PELVIC FLOOR DYSFUNCTION IN FEMALE: Primary | ICD-10-CM

## 2022-08-18 PROCEDURE — 97110 THERAPEUTIC EXERCISES: CPT | Mod: PN

## 2022-08-18 PROCEDURE — 97530 THERAPEUTIC ACTIVITIES: CPT | Mod: PN

## 2022-08-18 NOTE — PATIENT INSTRUCTIONS
GENERAL VULVAR CARE   Adapted from AMOL Kyle MD, The V Book     While you are seeking effective treatment for vulvar problems, here are some coping measures to relieve symptoms and prevent further irritation. Even when your symptoms are under control, these guidelines are recommended as a preventive strategy.    Clothing and Laundry   Wear all-white cotton underwear.   Do not wear pantyhose (wear thigh high or knee high hose instead).  Remove wet bathing suits and exercise clothing promptly. Avoid exercise in tight synthetic clothes. Avoid thongs completely.   Wear loose-fitting pants or skirts. Take your underwear off when at home and go without underpants.   Use dermatologically approved detergents such as Purex or Clear, Arm & Hammer (Sensitive Skin), All Free and Clear.   Double-rinse underwear and any other clothing that comes into contact with the vulva.  Do not use bleach or fabric softener on undergarments.    Sexual [place]   Use a lubricant that is water soluble, e.g., Astroglide, but be aware that it contains propylene glycol (P.G.). A dab of cooking oil is fine if condoms are not used.  Ask your physician for a prescription for a topical anesthetic, e.g., Lidocaine get 5% this way sting for the first 3-5 minutes after application).   Apply ice or a frozen blue gel pack (lunch box size) wrapped in one layer of a hand towel to relieve burning after intercourse. Frozen corn or peas in a small sealed plastic bag mold comfortably to vulvar anatomy.   Urinate to prevent infection and rinse the vulva with cool water after sexual intercourse.     Physical Exercise   Avoid exercises that put direct pressure on the vulva such as bicycle riding and horseback riding   Limit intense exercises that create a lot of friction in the vulvar area (try lower intensity exercises such as walking).   Use a frozen gel pack wrapped in a towel to relieve symptoms after exercise.   Enroll in a yoga class to learn stretching  and relaxation exercises.   Don't swim in highly chlorinated pools.   Avoid the use of hot tubs.     Everyday Living   Use a sitting cushion for long periods of sitting.   If you must sit at work, try to intersperse periods of standing (e.g., rearrange your office so that you can stand while you speak on the phone).   Learn some relaxation techniques to do during the day (The Relaxation and Stress Reduction Workbook by Lisseth Brantley and [place] or The Chronic Pain Control Workbook by Bernardo are recommended).

## 2022-08-19 ENCOUNTER — PATIENT MESSAGE (OUTPATIENT)
Dept: REHABILITATION | Facility: HOSPITAL | Age: 27
End: 2022-08-19
Payer: COMMERCIAL

## 2022-08-24 NOTE — PROGRESS NOTES
"  Physical Therapy Daily Treatment Note     Name: Marlene Venegas [facility] Number: [id number]    Therapy Diagnosis:   Encounter Diagnosis   Name Primary?    Pelvic floor dysfunction in female Yes     Physician: Krystal Sifuentes CNM    Visit Date: 2022    Physician Orders: PT Eval and Treat   Medical Diagnosis from Referral: M62.89 (ICD-10-CM) - Pelvic floor dysfunction  Evaluation Date: 2022  Authorization Period Expiration: 2023  Plan of Care Expiration: 10/25/2022  Progress Note Due: 2022  Visit # / Visits authorized: 3/ 20   FOTO: 1/3     Precautions: Standard     Time In: 4:30  Time Out: 5:25  Total Billable Time: 55 minutes    Precautions: Standard    Subjective     Pt reports: decreased vulvar chaffing with education given last visit.   She was not compliant with home exercise program.   Response to previous treatment: no change   Functional change: no change     Pain: 3/10  Location: pelvic pain    Constitutional Symptoms Review: The patient denies having any constitutional symptoms.     Objective   Intravaginal treatment performed today with verbal consent.  Signed consent form already on file.   (exercises performed today are bolded)      Therapeutic Exercise to develop  strength, endurance, ROM, flexibility, posture and core stabilization for 15 minutes includin degree 1/2 kneel lung with PF relaxation 3 x 15"   Supine modified butterfly stretch x 1 min   Happy baby stretch x 1 min   Bridges 2 x 10   pelvic circles on physio ball for postural stability and  desensitization of vulva x 10 cw/ccw  Alt hip abduction/adduction 10 x 10"   double knee to chest 10 x 10"     Ideas:  Sl reverse clams  Corner hamstring stretch  Corner hamstring stretch   Frog pose   Seated hip adductor/butterfly stretch   Deep squat PF stretch     Neuromuscular Re-education to develop Coordination and Down training for 0 minutes including:   diaphragmatic breathing     Manual Therapy to develop " flexibility, extensibility and desensitization for 30 minutes including: trigger point/myofascial release of B hip flexors and hip adductors    Perineal desternalization and clitoral mobs     Therapeutic Activity Patient participated in dynamic functional therapeutic activities to improve functional performance for 10 minutes. Including: Education as described below.    Home Exercises Provided and Patient Education Provided     Education provided:   anatomy/physiology of pelvic floor, posture/body mechanices, vulvar irritants, hygiene of pelvic floor, dilator use, pelvic wand use, hygiene of perineum, perineal stretching/massage and behavior modifications     Discussed progression of plan of care with patient; educated pt in activity modification; reviewed HEP with pt. Pt demonstrated and verbalized understanding of all instruction and was provided with a handout of HEP (see Patient Instructions).    Written Home Exercises Provided: yes.  Exercises were reviewed and Melva was able to demonstrate them prior to the end of the session.  Melva demonstrated good  understanding of the education provided.     See EMR under Patient Instructions for exercises provided 8/19/2022.    Assessment     Patient presents with continued pelvic pain. Patient consented and was able to tolerated perineal desensitization and clitoral mobs for the first time today. Verbal HEP was given to perform at home. Increased exercise and manual tolerance noted today. Patient progressed from single digit palpation to double digit palpation of perineum/desensitization from beginning to end of session. Planned to have her  come to future visits to be able to teach HEP, desensitization, and mobs. Pt will continue to benefit from skilled outpatient physical therapy to address the deficits listed in the problem list box on initial evaluation, provide pt/family education and to maximize pt's level of independence in the home and community  environment.     Melva Is progressing well towards her goals.   Pt prognosis is Fair.     Anticipated barriers to physical therapy: life stressors    Progress towards goals:  fair    Goals:   Short Term Goals: 4 weeks   Pt to demonstrate proper diaphragmatic breathing to help with calming the nervous system in order to decrease pain and to improve abdominal wall musculature extensibility.   Pt to report being able to complete a half day at work without significant increase in pain to demonstrate a return towards prior level of function.  Pt to demonstrate good body mechanics when performing ADLs such as lifting and bending to decrease strain to lumbopelvic structures and reduce risk of further injury.        Long Term Goals: 12 weeks   Pt to be discharged with home plan for carry over after discharge.   Pt will be trained and compliant with postural strategies in sitting and standing to improve alignment and decrease pain and muscle fatigue  Pt to report being able to complete a full day at work without significant increase in pain to demonstrate a return towards prior level of function.  Pt to report being able to have a comfortable vaginal exam without significant increase in pelvic pain.      New/Revised goals: Continue with current established goals at this time.      Pt's spiritual, cultural and educational needs considered and pt agreeable to plan of care and goals.    Plan     Plan of care Certification: 7/25/2022 to 10/25/2022.     Continue with established Plan of Care, working toward established PT goals.    Marquita Kyle, PT, DPT, PPCES   08/25/2022

## 2022-08-25 ENCOUNTER — CLINICAL SUPPORT (OUTPATIENT)
Dept: REHABILITATION | Facility: HOSPITAL | Age: 27
End: 2022-08-25
Payer: COMMERCIAL

## 2022-08-25 DIAGNOSIS — M62.89 PELVIC FLOOR DYSFUNCTION IN FEMALE: Primary | ICD-10-CM

## 2022-08-25 PROCEDURE — 97110 THERAPEUTIC EXERCISES: CPT | Mod: PN

## 2022-08-25 PROCEDURE — 97140 MANUAL THERAPY 1/> REGIONS: CPT | Mod: PN

## 2022-08-25 PROCEDURE — 97530 THERAPEUTIC ACTIVITIES: CPT | Mod: PN

## 2022-08-30 NOTE — PROGRESS NOTES
"  Physical Therapy Daily Treatment Note     Name: Marlene Venegas [facility] Number: [id number]    Therapy Diagnosis:   Encounter Diagnosis   Name Primary?    Pelvic floor dysfunction in female Yes       Physician: Krystal Sifuentes CNM    Visit Date: 2022    Physician Orders: PT Eval and Treat   Medical Diagnosis from Referral: M62.89 (ICD-10-CM) - Pelvic floor dysfunction  Evaluation Date: 2022  Authorization Period Expiration: 2023  Plan of Care Expiration: 10/25/2022  Progress Note Due: 2022  Visit # / Visits authorized:    FOTO: 1/3     Precautions: Standard     Time In: 4:00  Time Out: 4:47  Total Billable Time: 47 minutes    Precautions: Standard    Subjective     Pt reports: legs locking up from her hips to her knees on . After discussion may have been from dehydration.     She was not compliant with home exercise program.   Response to previous treatment: no change   Functional change: no change     Pain: 3/10  Location: pelvic pain    Constitutional Symptoms Review: The patient denies having any constitutional symptoms.     Objective   Intravaginal treatment performed today with verbal consent.  Signed consent form already on file.   (exercises performed today are bolded)      Therapeutic Exercise to develop  strength, endurance, ROM, flexibility, posture and core stabilization for 0 minutes includin degree 1/2 kneel lung with PF relaxation 3 x 15"   Supine modified butterfly stretch x 1 min   Happy baby stretch x 1 min   Bridges 2 x 10   pelvic circles on physio ball for postural stability and  desensitization of vulva x 10 cw/ccw   Alt hip abduction/adduction 10 x 10"   double knee to chest 10 x 10"     Ideas:  Sl reverse clams  Corner hamstring stretch  Corner hamstring stretch   Frog pose   Seated hip adductor/butterfly stretch   Deep squat PF stretch     Neuromuscular Re-education to develop Coordination and Down training for 0 minutes including: "   diaphragmatic breathing     Manual Therapy to develop flexibility, extensibility and desensitization for 37 minutes including: trigger point/myofascial release of B hip flexors and hip adductors     Perineal desternalization and clitoral mobs     Therapeutic Activity Patient participated in dynamic functional therapeutic activities to improve functional performance for 10 minutes. Including: Education as described below.    Home Exercises Provided and Patient Education Provided     Education provided:   anatomy/physiology of pelvic floor, posture/body mechanices, vulvar irritants, hygiene of pelvic floor, intercourse positions, hygiene of perineum, perineal stretching/massage, and behavior modifications     Discussed progression of plan of care with patient; educated pt in activity modification; reviewed HEP with pt. Pt demonstrated and verbalized understanding of all instruction and was provided with a handout of HEP (see Patient Instructions).    Written Home Exercises Provided: yes.  Exercises were reviewed and Melva was able to demonstrate them prior to the end of the session.  Melva demonstrated good  understanding of the education provided.     See EMR under Patient Instructions for exercises provided  8/19/2022 .    Assessment     Patient presents with continued pelvic pain. Increased tolerance with clitoral mobs and perineal palpation/soft tissue massage. Patient continues to be hypersensitive and have tearing sensation with palpation of posterior fourchette. Plan to have her  at next appointment to teach HEP and increase HEP compliance. Pt will continue to benefit from skilled outpatient physical therapy to address the deficits listed in the problem list box on initial evaluation, provide pt/family education and to maximize pt's level of independence in the home and community environment.     Melva Is progressing well towards her goals.   Pt prognosis is Fair.     Anticipated barriers to  physical therapy: life stressors and poor HEP compliance     Progress towards goals:  fair    Goals:   Short Term Goals: 4 weeks   Pt to demonstrate proper diaphragmatic breathing to help with calming the nervous system in order to decrease pain and to improve abdominal wall musculature extensibility. PC  2. Pt to report being able to complete a half day at work without significant increase in pain to demonstrate a return towards prior level of function. PC  Pt to demonstrate good body mechanics when performing ADLs such as lifting and bending to decrease strain to lumbopelvic structures and reduce risk of further injury. PC        Long Term Goals: 12 weeks   Pt to be discharged with home plan for carry over after discharge. PC  Pt will be trained and compliant with postural strategies in sitting and standing to improve alignment and decrease pain and muscle fatigue. PC  Pt to report being able to complete a full day at work without significant increase in pain to demonstrate a return towards prior level of function. PC  Pt to report being able to have a comfortable vaginal exam without significant increase in pelvic pain. PC    PC= progressing/continue     New/Revised goals: Continue with current established goals at this time.      Pt's spiritual, cultural and educational needs considered and pt agreeable to plan of care and goals.    Plan     Plan of care Certification: 7/25/2022 to 10/25/2022.     Continue with established Plan of Care, working toward established PT goals.    Marquita Kyle, PT, DPT, PPCES   09/02/2022

## 2022-09-01 ENCOUNTER — CLINICAL SUPPORT (OUTPATIENT)
Dept: REHABILITATION | Facility: HOSPITAL | Age: 27
End: 2022-09-01
Payer: COMMERCIAL

## 2022-09-01 DIAGNOSIS — M62.89 PELVIC FLOOR DYSFUNCTION IN FEMALE: Primary | ICD-10-CM

## 2022-09-01 PROCEDURE — 97530 THERAPEUTIC ACTIVITIES: CPT | Mod: PN

## 2022-09-01 PROCEDURE — 97140 MANUAL THERAPY 1/> REGIONS: CPT | Mod: PN

## 2022-09-06 NOTE — PROGRESS NOTES
"  Physical Therapy Daily Treatment Note     Name: Marlene Venegas [facility] Number: [id number]    Therapy Diagnosis:   Encounter Diagnosis   Name Primary?    Pelvic floor dysfunction in female Yes       Physician: Krystal Sifuentes CNM    Visit Date: 2022    Physician Orders: PT Eval and Treat   Medical Diagnosis from Referral: M62.89 (ICD-10-CM) - Pelvic floor dysfunction  Evaluation Date: 2022  Authorization Period Expiration: 2023  Plan of Care Expiration: 10/25/2022  Progress Note Due: 2022  Visit # / Visits authorized:    FOTO: 1/3     Precautions: Standard     Time In: 4:00  Time Out: 4:47  Total Billable Time: 47 minutes    Precautions: Standard    Subjective     Pt reports: doesn't feel as tight in her hips today.     She was not compliant with home exercise program.   Response to previous treatment: no change   Functional change: no change     Pain: 1/10  Location: pelvic pain    Constitutional Symptoms Review: The patient denies having any constitutional symptoms.     Objective   Intravaginal treatment performed today with verbal consent.  Signed consent form already on file.   (exercises performed today are bolded)    Therapeutic Exercise to develop  strength, endurance, ROM, flexibility, posture and core stabilization for 10 minutes includin degree 1/2 kneel lung with PF relaxation 3 x 15"   Supine modified butterfly stretch x 1 min   Happy baby stretch x 1 min   Bridges 2 x 10   pelvic circles on physio ball for postural stability and  desensitization of vulva x 10 cw/ccw   Alt hip abduction/adduction 10 x 10"   double knee to chest 10 x 10"     Ideas:  Sl reverse clams  Corner hamstring stretch  Corner hamstring stretch   Frog pose   Seated hip adductor/butterfly stretch   Deep squat PF stretch     Neuromuscular Re-education to develop Coordination and Down training for 0 minutes including:   diaphragmatic breathing throughout session, especially manual therapy "     Manual Therapy to develop flexibility, extensibility and desensitization for 30 minutes including:   trigger point/myofascial release of B hip flexors and hip adductors     Perineal desternalization and clitoral mobs     Therapeutic Activity Patient participated in dynamic functional therapeutic activities to improve functional performance for 7 minutes. Including: Education as described below.    Home Exercises Provided and Patient Education Provided     Education provided:   anatomy/physiology of pelvic floor, posture/body mechanices, vulvar irritants, hygiene of pelvic floor, intercourse positions, hygiene of perineum, perineal stretching/massage, and behavior modifications     Discussed progression of plan of care with patient; educated pt in activity modification; reviewed HEP with pt. Pt demonstrated and verbalized understanding of all instruction and was provided with a handout of HEP (see Patient Instructions).    Written Home Exercises Provided: yes.  Exercises were reviewed and Melva was able to demonstrate them prior to the end of the session.  Melva demonstrated good  understanding of the education provided.     See EMR under Patient Instructions for exercises provided  8/19/2022 .    Assessment     Patient presents with decreased pelvic pain. Increased tolerance with clitoral mobs and perineal palpation/soft tissue massage. Decrease sensitivity to posterior fourchette noted today. Patient able to tolerate light finger tip perineal stretch. Focusing on breathing really helps patient stay calm during manual therapy. Patient presents with excellent diaphragmatic breathing form. Patient is progressing well with therapy and has met 2 goals. Pt will continue to benefit from skilled outpatient physical therapy to address the deficits listed in the problem list box on initial evaluation, provide pt/family education and to maximize pt's level of independence in the home and community environment.      Melva Is progressing well towards her goals.   Pt prognosis is Good.     Anticipated barriers to physical therapy: life stressors and poor HEP compliance     Progress towards goals:  fair    Goals:   Short Term Goals: 4 weeks   Pt to demonstrate proper diaphragmatic breathing to help with calming the nervous system in order to decrease pain and to improve abdominal wall musculature extensibility. MET 9/8/2022  2. Pt to report being able to complete a half day at work without significant increase in pain to demonstrate a return towards prior level of function. MET 9/8/2022  Pt to demonstrate good body mechanics when performing ADLs such as lifting and bending to decrease strain to lumbopelvic structures and reduce risk of further injury. PC        Long Term Goals: 12 weeks   Pt to be discharged with home plan for carry over after discharge. PC  Pt will be trained and compliant with postural strategies in sitting and standing to improve alignment and decrease pain and muscle fatigue. PC  Pt to report being able to complete a full day at work without significant increase in pain to demonstrate a return towards prior level of function. PC  Pt to report being able to have a comfortable vaginal exam without significant increase in pelvic pain. PC    PC= progressing/continue     New/Revised goals: Continue with current established goals at this time.      Pt's spiritual, cultural and educational needs considered and pt agreeable to plan of care and goals.    Plan     Plan of care Certification: 7/25/2022 to 10/25/2022.     Continue with established Plan of Care, working toward established PT goals.    Marquita Kyle, PT, DPT, PPCES   09/09/2022

## 2022-09-08 ENCOUNTER — CLINICAL SUPPORT (OUTPATIENT)
Dept: REHABILITATION | Facility: HOSPITAL | Age: 27
End: 2022-09-08
Payer: COMMERCIAL

## 2022-09-08 DIAGNOSIS — M62.89 PELVIC FLOOR DYSFUNCTION IN FEMALE: Primary | ICD-10-CM

## 2022-09-08 PROCEDURE — 97140 MANUAL THERAPY 1/> REGIONS: CPT | Mod: PN

## 2022-09-08 PROCEDURE — 97110 THERAPEUTIC EXERCISES: CPT | Mod: PN

## 2022-09-14 NOTE — PROGRESS NOTES
"  Physical Therapy Daily Treatment Note     Name: Marlene Venegas [facility] Number: [id number]    Therapy Diagnosis:   Encounter Diagnosis   Name Primary?    Pelvic floor dysfunction in female Yes       Physician: Krystal Sifuentes CNM    Visit Date: 9/15/2022    Physician Orders: PT Eval and Treat   Medical Diagnosis from Referral: M62.89 (ICD-10-CM) - Pelvic floor dysfunction  Evaluation Date: 2022  Authorization Period Expiration: 2023  Plan of Care Expiration: 10/25/2022  Progress Note Due: 10/15/2022  Visit # / Visits authorized:    FOTO: 1/3     Precautions: Standard     Time In: 4:02  Time Out: 4:47  Total Billable Time: 45 minutes    Precautions: Standard    Subjective     Pt reports: continued soreness on  following treatments with no know cause.     She was not compliant with home exercise program.   Response to previous treatment: no change   Functional change: no change     Pain: 1/10  Location: pelvic pain    Constitutional Symptoms Review: The patient denies having any constitutional symptoms.     Objective   Intravaginal treatment performed today with verbal consent.  Signed consent form already on file.   (exercises performed today are bolded)    Therapeutic Exercise to develop  strength, endurance, ROM, flexibility, posture and core stabilization for 0 minutes includin degree 1/2 kneel lung with PF relaxation 3 x 15"   Supine modified butterfly stretch x 1 min   Happy baby stretch x 1 min   Bridges 2 x 10   pelvic circles on physio ball for postural stability and  desensitization of vulva x 10 cw/ccw   Alt hip abduction/adduction 10 x 10"   double knee to chest 10 x 10"     Ideas:  Sl reverse clams  Corner hamstring stretch  Corner hamstring stretch   Frog pose   Seated hip adductor/butterfly stretch   Deep squat PF stretch     Neuromuscular Re-education to develop Coordination and Down training for 0 minutes including:   diaphragmatic breathing throughout " session, especially manual therapy     Manual Therapy to develop flexibility, extensibility and desensitization for 40 minutes including:   trigger point/myofascial release of B hip flexors and hip adductors     Perineal desensitization and clitoral mobs     Therapeutic Activity Patient participated in dynamic functional therapeutic activities to improve functional performance for 5 minutes. Including: Education as described below.  Reviewed and patient videoed explanation of perineal massage/desensitization and myofascial release of hip flexor/adductor     Home Exercises Provided and Patient Education Provided     Education provided:   anatomy/physiology of pelvic floor, posture/body mechanices, vulvar irritants, hygiene of pelvic floor, intercourse positions, hygiene of perineum, perineal stretching/massage, and behavior modifications     Discussed progression of plan of care with patient; educated pt in activity modification; reviewed HEP with pt. Pt demonstrated and verbalized understanding of all instruction and was provided with a handout of HEP (see Patient Instructions).    Written Home Exercises Provided: yes.  Exercises were reviewed and Melva was able to demonstrate them prior to the end of the session.  Melva demonstrated good  understanding of the education provided.     See EMR under Patient Instructions for exercises provided  8/19/2022 .    Assessment     Patient continues to progress well with therapy and present with increased manual therapy tolerance. Patient able to tolerate palpation and minimal stretching to posterior fourchette. Patient also able to tolerate insertion to first PIP of index finger for prolonged amount of time. Repeated demonstration of perineal massage and myofascial release of hip flexors and hip adductors and allowed patient video on pelvic model for increased home exercise compliance. Pt will continue to benefit from skilled outpatient physical therapy to address the  deficits listed in the problem list box on initial evaluation, provide pt/family education and to maximize pt's level of independence in the home and community environment.     Melva Is progressing well towards her goals.   Pt prognosis is Good.     Anticipated barriers to physical therapy: life stressors and poor HEP compliance     Progress towards goals:  fair    Goals:   Short Term Goals: 4 weeks   Pt to demonstrate proper diaphragmatic breathing to help with calming the nervous system in order to decrease pain and to improve abdominal wall musculature extensibility. MET 9/8/2022  2. Pt to report being able to complete a half day at work without significant increase in pain to demonstrate a return towards prior level of function. MET 9/8/2022  Pt to demonstrate good body mechanics when performing ADLs such as lifting and bending to decrease strain to lumbopelvic structures and reduce risk of further injury. PC        Long Term Goals: 12 weeks   Pt to be discharged with home plan for carry over after discharge. PC  Pt will be trained and compliant with postural strategies in sitting and standing to improve alignment and decrease pain and muscle fatigue. PC  Pt to report being able to complete a full day at work without significant increase in pain to demonstrate a return towards prior level of function. PC  Pt to report being able to have a comfortable vaginal exam without significant increase in pelvic pain. PC    PC= progressing/continue     New/Revised goals: Continue with current established goals at this time.      Pt's spiritual, cultural and educational needs considered and pt agreeable to plan of care and goals.    Plan     Plan of care Certification: 7/25/2022 to 10/25/2022.     Continue with established Plan of Care, working toward established PT goals.    Marquita Kyle, PT, DPT, PPCES   09/15/2022

## 2022-09-15 ENCOUNTER — CLINICAL SUPPORT (OUTPATIENT)
Dept: REHABILITATION | Facility: HOSPITAL | Age: 27
End: 2022-09-15
Payer: COMMERCIAL

## 2022-09-15 DIAGNOSIS — M62.89 PELVIC FLOOR DYSFUNCTION IN FEMALE: Primary | ICD-10-CM

## 2022-09-15 PROCEDURE — 97140 MANUAL THERAPY 1/> REGIONS: CPT | Mod: PN

## 2022-09-19 ENCOUNTER — PATIENT MESSAGE (OUTPATIENT)
Dept: REHABILITATION | Facility: HOSPITAL | Age: 27
End: 2022-09-19
Payer: COMMERCIAL

## 2022-09-19 NOTE — PROGRESS NOTES
"  Physical Therapy Daily Treatment Note     Name: Marlene Venegas [facility] Number: [id number]    Therapy Diagnosis:   Encounter Diagnosis   Name Primary?    Pelvic floor dysfunction in female Yes     Physician: Krystal Sifuentes CNM    Visit Date: 2022    Physician Orders: PT Eval and Treat   Medical Diagnosis from Referral: M62.89 (ICD-10-CM) - Pelvic floor dysfunction  Evaluation Date: 2022  Authorization Period Expiration: 2023  Plan of Care Expiration: 10/25/2022  Progress Note Due: 10/15/2022  Visit # / Visits authorized:    FOTO: 1/3     Precautions: Standard     Time In: 4:02  Time Out: 4:47  Total Billable Time: 45 minutes    Precautions: Standard    Subjective     Pt reports: being able to perform perineal desensitization at home with partner for the first time. Patient reports feeling a hard stop when attempting to insert her finger or partner. Patient reports being fearful something is blocking her vaginal canal.   She was compliant with home exercise program.   Response to previous treatment: no change   Functional change: no change     Pain: 1/10  Location: pelvic pain    Constitutional Symptoms Review: The patient denies having any constitutional symptoms.     Objective   Intravaginal treatment performed today with verbal consent.  Signed consent form already on file.   (exercises performed today are bolded)    Vaginal vault: stenotic   Muscle Bulk: hypertonus     Therapeutic Exercise to develop  strength, endurance, ROM, flexibility, posture and core stabilization for 0 minutes includin degree 1/2 kneel lung with PF relaxation 3 x 15"   Supine modified butterfly stretch x 1 min   Happy baby stretch x 1 min   Bridges 2 x 10   pelvic circles on physio ball for postural stability and  desensitization of vulva x 10 cw/ccw   Alt hip abduction/adduction 10 x 10"   double knee to chest 10 x 10"     Ideas:  Sl reverse clams  Corner hamstring stretch  Corner hamstring " stretch   Frog pose   Seated hip adductor/butterfly stretch   Deep squat PF stretch     Neuromuscular Re-education to develop Coordination and Down training for 0 minutes including:   diaphragmatic breathing throughout session, especially manual therapy     Manual Therapy to develop flexibility, extensibility and desensitization for 35 minutes including:   trigger point/myofascial release of B hip flexors and hip adductors     Perineal massage/stretching and levator ani internal myofascial release     Therapeutic Activity Patient participated in dynamic functional therapeutic activities to improve functional performance for 10 minutes. Including: Education as described below.  Reviewed perineal massage and dilator use     Home Exercises Provided and Patient Education Provided     Education provided:   anatomy/physiology of pelvic floor, posture/body mechanices, intercourse positions, dilator use, pelvic wand use, diaphragmatic breathing, perineal stretching/massage, and behavior modifications     Discussed progression of plan of care with patient; educated pt in activity modification; reviewed HEP with pt. Pt demonstrated and verbalized understanding of all instruction and was provided with a handout of HEP (see Patient Instructions).    Written Home Exercises Provided: Patient instructed to cont prior HEP.  Exercises were reviewed and Melva was able to demonstrate them prior to the end of the session.  Melva demonstrated good  understanding of the education provided.     See EMR under Patient Instructions for exercises provided  8/19/2022 .    Assessment     Patient presents being complaint with HEP for the first time and was able to perform perineal desensitization with partner. Patient presents with increased manual tolerance today being able to tolerate full insertion of digit and internal levator ani myofascial release. Patient reports the most pain with removal of digit internally. Patient 's vaginal vault  continues to be very stenotic and hypertonic. Reviewed perineal massage, internal myofascial release, and dilator use for home exercise program. Pt will continue to benefit from skilled outpatient physical therapy to address the deficits listed in the problem list box on initial evaluation, provide pt/family education and to maximize pt's level of independence in the home and community environment.     Melva Is progressing well towards her goals.   Pt prognosis is Good.     Anticipated barriers to physical therapy: life stressors and poor HEP compliance     Progress towards goals:  fair    Goals:   Short Term Goals: 4 weeks   Pt to demonstrate proper diaphragmatic breathing to help with calming the nervous system in order to decrease pain and to improve abdominal wall musculature extensibility. MET 9/8/2022  2. Pt to report being able to complete a half day at work without significant increase in pain to demonstrate a return towards prior level of function. MET 9/8/2022  Pt to demonstrate good body mechanics when performing ADLs such as lifting and bending to decrease strain to lumbopelvic structures and reduce risk of further injury. PC        Long Term Goals: 12 weeks   Pt to be discharged with home plan for carry over after discharge. PC  Pt will be trained and compliant with postural strategies in sitting and standing to improve alignment and decrease pain and muscle fatigue. PC  Pt to report being able to complete a full day at work without significant increase in pain to demonstrate a return towards prior level of function. PC  Pt to report being able to have a comfortable vaginal exam without significant increase in pelvic pain. PC    PC= progressing/continue     New/Revised goals: Continue with current established goals at this time.      Pt's spiritual, cultural and educational needs considered and pt agreeable to plan of care and goals.    Plan     Plan of care Certification: 7/25/2022 to 10/25/2022.      Continue with established Plan of Care, working toward established PT goals.    Marquita Kyle, PT, DPT, PPCES   09/21/2022

## 2022-09-20 ENCOUNTER — CLINICAL SUPPORT (OUTPATIENT)
Dept: REHABILITATION | Facility: HOSPITAL | Age: 27
End: 2022-09-20
Payer: COMMERCIAL

## 2022-09-20 DIAGNOSIS — M62.89 PELVIC FLOOR DYSFUNCTION IN FEMALE: Primary | ICD-10-CM

## 2022-09-20 PROCEDURE — 97530 THERAPEUTIC ACTIVITIES: CPT | Mod: PN

## 2022-09-20 PROCEDURE — 97140 MANUAL THERAPY 1/> REGIONS: CPT | Mod: PN

## 2022-09-26 NOTE — PROGRESS NOTES
"  Physical Therapy Daily Treatment Note     Name: Marlene Venegas [facility] [address] Number: [id number]    Therapy Diagnosis:   Encounter Diagnosis   Name Primary?    Pelvic floor dysfunction in female Yes       Physician: Krystal Sifuentes CNM    Visit Date: 2022    Physician Orders: PT Eval and Treat   Medical Diagnosis from Referral: M62.89 (ICD-10-CM) - Pelvic floor dysfunction  Evaluation Date: 2022  Authorization Period Expiration: 2023  Plan of Care Expiration: 10/25/2022  Progress Note Due: 10/15/2022  Visit # / Visits authorized:    FOTO: 1/3     Precautions: Standard     Time In: 4:00  Time Out: 4:45  Total Billable Time: 45 minutes    Precautions: Standard    Subjective     Pt reports: She hasn't made any progress since last visit, but  is present today.    She was compliant with home exercise program.   Response to previous treatment: no change   Functional change: no change     Pain: 1/10  Location: pelvic pain    Constitutional Symptoms Review: The patient denies having any constitutional symptoms.     Objective   Intravaginal treatment performed today with verbal consent.  Signed consent form already on file.   (exercises performed today are bolded)    Vaginal vault: stenotic   Muscle Bulk: decreased hypertonicity noted    Therapeutic Exercise to develop  strength, endurance, ROM, flexibility, posture and core stabilization for 0 minutes includin degree 1/2 kneel lung with PF relaxation 3 x 15"   Supine modified butterfly stretch x 1 min   Happy baby stretch x 1 min   Bridges 2 x 10   pelvic circles on physio ball for postural stability and  desensitization of vulva x 10 cw/ccw   Alt hip abduction/adduction 10 x 10"   double knee to chest 10 x 10"     Ideas:  Sl reverse clams  Corner hamstring stretch  Corner hamstring stretch   Frog pose   Seated hip adductor/butterfly stretch   Deep squat PF stretch     Neuromuscular Re-education to develop Coordination and Down " training for 0 minutes including:   diaphragmatic breathing throughout session, especially manual therapy     Manual Therapy to develop flexibility, extensibility and desensitization for 20 minutes including:   trigger point/myofascial release of B hip flexors and hip adductors     Perineal massage/stretching and levator ani internal myofascial release     Therapeutic Activity Patient participated in dynamic functional therapeutic activities to improve functional performance for 25 minutes. Including: Education as described below.  Teaching patient's  the pelvic anatomy and coaching thru perineal massage and stretching     Home Exercises Provided and Patient Education Provided     Education provided:   anatomy/physiology of pelvic floor, posture/body mechanices, intercourse positions, dilator use, pelvic wand use, diaphragmatic breathing, perineal stretching/massage, and behavior modifications     Discussed progression of plan of care with patient; educated pt in activity modification; reviewed HEP with pt. Pt demonstrated and verbalized understanding of all instruction and was provided with a handout of HEP (see Patient Instructions).    Written Home Exercises Provided: Patient instructed to cont prior HEP.  Exercises were reviewed and Melva was able to demonstrate them prior to the end of the session.  Melva demonstrated good  understanding of the education provided.     See EMR under Patient Instructions for exercises provided  8/19/2022 .    Assessment     Patient presents with her  present for education of pelvic anatomy and pelvic floor manual techniques. Patient continues to reports the most pain with removal of digit internally. Patient 's vaginal vault was less stenotic and hypertonus today. Reviewed perineal massage and internal myofascial release with patients . Pt will continue to benefit from skilled outpatient physical therapy to address the deficits listed in the problem list  box on initial evaluation, provide pt/family education and to maximize pt's level of independence in the home and community environment.     Melva Is progressing well towards her goals.   Pt prognosis is Good.     Anticipated barriers to physical therapy: life stressors and poor HEP compliance     Progress towards goals:  fair    Goals:   Short Term Goals: 4 weeks   Pt to demonstrate proper diaphragmatic breathing to help with calming the nervous system in order to decrease pain and to improve abdominal wall musculature extensibility. MET 9/8/2022  2. Pt to report being able to complete a half day at work without significant increase in pain to demonstrate a return towards prior level of function. MET 9/8/2022  Pt to demonstrate good body mechanics when performing ADLs such as lifting and bending to decrease strain to lumbopelvic structures and reduce risk of further injury. PC        Long Term Goals: 12 weeks   Pt to be discharged with home plan for carry over after discharge. PC  Pt will be trained and compliant with postural strategies in sitting and standing to improve alignment and decrease pain and muscle fatigue. PC  Pt to report being able to complete a full day at work without significant increase in pain to demonstrate a return towards prior level of function. PC  Pt to report being able to have a comfortable vaginal exam without significant increase in pelvic pain. PC    PC= progressing/continue     New/Revised goals: Continue with current established goals at this time.      Pt's spiritual, cultural and educational needs considered and pt agreeable to plan of care and goals.    Plan     Plan of care Certification: 7/25/2022 to 10/25/2022.     Continue with established Plan of Care, working toward established PT goals.    Marquita Kyle, PT, DPT, PPCES   09/30/2022

## 2022-09-29 ENCOUNTER — CLINICAL SUPPORT (OUTPATIENT)
Dept: REHABILITATION | Facility: HOSPITAL | Age: 27
End: 2022-09-29
Payer: COMMERCIAL

## 2022-09-29 DIAGNOSIS — M62.89 PELVIC FLOOR DYSFUNCTION IN FEMALE: Primary | ICD-10-CM

## 2022-09-29 PROCEDURE — 97140 MANUAL THERAPY 1/> REGIONS: CPT | Mod: PN

## 2022-09-29 PROCEDURE — 97530 THERAPEUTIC ACTIVITIES: CPT | Mod: PN

## 2022-10-01 ENCOUNTER — PATIENT MESSAGE (OUTPATIENT)
Dept: OBSTETRICS AND GYNECOLOGY | Facility: CLINIC | Age: 27
End: 2022-10-01
Payer: COMMERCIAL

## 2022-10-02 ENCOUNTER — PATIENT MESSAGE (OUTPATIENT)
Dept: OBSTETRICS AND GYNECOLOGY | Facility: CLINIC | Age: 27
End: 2022-10-02
Payer: COMMERCIAL

## 2022-10-06 ENCOUNTER — CLINICAL SUPPORT (OUTPATIENT)
Dept: REHABILITATION | Facility: HOSPITAL | Age: 27
End: 2022-10-06
Payer: COMMERCIAL

## 2022-10-06 DIAGNOSIS — M62.89 PELVIC FLOOR DYSFUNCTION IN FEMALE: Primary | ICD-10-CM

## 2022-10-06 PROCEDURE — 97110 THERAPEUTIC EXERCISES: CPT | Mod: PN

## 2022-10-06 PROCEDURE — 97140 MANUAL THERAPY 1/> REGIONS: CPT | Mod: PN

## 2022-10-06 NOTE — PROGRESS NOTES
"  Physical Therapy Daily Treatment Note     Name: Marlene Venegas [facility] Number: [id number]    Therapy Diagnosis:   Encounter Diagnosis   Name Primary?    Pelvic floor dysfunction in female Yes       Physician: Krystal Sifuentes CNM    Visit Date: 10/6/2022    Physician Orders: PT Eval and Treat   Medical Diagnosis from Referral: M62.89 (ICD-10-CM) - Pelvic floor dysfunction  Evaluation Date: 2022  Authorization Period Expiration: 2023  Plan of Care Expiration: 10/25/2022  Progress Note Due: 10/15/2022  Visit # / Visits authorized:    FOTO: 1/3     Precautions: Standard     Time In: 4:00  Time Out: 4:45  Total Billable Time: 45 minutes    Precautions: Standard    Subjective     Pt reports: her and her  tried but she was too tight.     Plan to sit on patient 's right side next visit for increased manual therapy on right side.     She was compliant with home exercise program.   Response to previous treatment: no change   Functional change: no change     Pain: 1/10  Location: pelvic pain    Constitutional Symptoms Review: The patient denies having any constitutional symptoms.     Objective   Intravaginal treatment performed today with verbal consent.  Signed consent form already on file.   (exercises performed today are bolded)    Vaginal vault: stenotic   Muscle Bulk: continued hypertonicity noted    Therapeutic Exercise to develop  strength, endurance, ROM, flexibility, posture and core stabilization for 8 minutes includin degree 1/2 kneel lung with PF relaxation x 8 minutes  Supine modified butterfly stretch x 1 min   Happy baby stretch x 1 min   Bridges 2 x 10   pelvic circles on physio ball for postural stability and  desensitization of vulva x 10 cw/ccw   Alt hip abduction/adduction 10 x 10"   double knee to chest 10 x 10"     Ideas:  Sl reverse clams  Corner hamstring stretch  Corner hamstring stretch   Frog pose   Seated hip adductor/butterfly stretch   Deep squat PF " stretch     Neuromuscular Re-education to develop Coordination and Down training for 0 minutes including:   diaphragmatic breathing throughout session, especially manual therapy     Manual Therapy to develop flexibility, extensibility and desensitization for 35 minutes including:   trigger point/myofascial release of B hip flexors and hip adductors     Perineal massage/stretching and levator ani internal myofascial release     Therapeutic Activity Patient participated in dynamic functional therapeutic activities to improve functional performance for 2 minutes. Including: Education as described below.  Reviewed HEP     Home Exercises Provided and Patient Education Provided     Education provided:   anatomy/physiology of pelvic floor, posture/body mechanices, intercourse positions, dilator use, pelvic wand use, diaphragmatic breathing, perineal stretching/massage, and behavior modifications     Discussed progression of plan of care with patient; educated pt in activity modification; reviewed HEP with pt. Pt demonstrated and verbalized understanding of all instruction and was provided with a handout of HEP (see Patient Instructions).    Written Home Exercises Provided: Patient instructed to cont prior HEP.  Exercises were reviewed and Melva was able to demonstrate them prior to the end of the session.  Melva demonstrated good  understanding of the education provided.     See EMR under Patient Instructions for exercises provided  8/19/2022 .    Assessment     Patient was able to tolerate increased pressure with myofascial release internally and hooking motion to stretch. Plan to sit on patient 's right side next visit for increased manual therapy on right side. Patient continues to reports the most pain with removal of digit internally. Reviewed importance of home exercise compliance. Pt will continue to benefit from skilled outpatient physical therapy to address the deficits listed in the problem list box on initial  evaluation, provide pt/family education and to maximize pt's level of independence in the home and community environment.     Melva Is progressing well towards her goals.   Pt prognosis is Good.     Anticipated barriers to physical therapy: life stressors and poor HEP compliance     Progress towards goals:  fair    Goals:   Short Term Goals: 4 weeks   Pt to demonstrate proper diaphragmatic breathing to help with calming the nervous system in order to decrease pain and to improve abdominal wall musculature extensibility. MET 9/8/2022  2. Pt to report being able to complete a half day at work without significant increase in pain to demonstrate a return towards prior level of function. MET 9/8/2022  Pt to demonstrate good body mechanics when performing ADLs such as lifting and bending to decrease strain to lumbopelvic structures and reduce risk of further injury. PC        Long Term Goals: 12 weeks   Pt to be discharged with home plan for carry over after discharge. PC  Pt will be trained and compliant with postural strategies in sitting and standing to improve alignment and decrease pain and muscle fatigue. PC  Pt to report being able to complete a full day at work without significant increase in pain to demonstrate a return towards prior level of function. PC  Pt to report being able to have a comfortable vaginal exam without significant increase in pelvic pain. PC    PC= progressing/continue     New/Revised goals: Continue with current established goals at this time.      Pt's spiritual, cultural and educational needs considered and pt agreeable to plan of care and goals.    Plan     Plan of care Certification: 7/25/2022 to 10/25/2022.     Continue with established Plan of Care, working toward established PT goals.    Marquita Kyle, PT, DPT, PPCES   10/06/2022

## 2022-10-10 ENCOUNTER — PATIENT MESSAGE (OUTPATIENT)
Dept: REHABILITATION | Facility: HOSPITAL | Age: 27
End: 2022-10-10
Payer: COMMERCIAL

## 2022-11-08 ENCOUNTER — PATIENT MESSAGE (OUTPATIENT)
Dept: REHABILITATION | Facility: HOSPITAL | Age: 27
End: 2022-11-08
Payer: COMMERCIAL

## 2022-12-28 ENCOUNTER — DOCUMENTATION ONLY (OUTPATIENT)
Dept: REHABILITATION | Facility: HOSPITAL | Age: 27
End: 2022-12-28
Payer: COMMERCIAL

## 2022-12-28 NOTE — PROGRESS NOTES
[facility]   Discharge Note    Name: Marlene Venegas [facility] Number: [id number]    Therapy Diagnosis:        Encounter Diagnosis   Name Primary?    Pelvic floor dysfunction in female Yes         Physician: Krystal Sifuentes CNM     Last Visit Date: 10/6/2022     Physician Orders: PT Eval and Treat   Medical Diagnosis from Referral: M62.89 (ICD-10-CM) - Pelvic floor dysfunction  Evaluation Date: 7/25/2022    Total Visits Received: 10    ASSESSMENT      Discharge reason: Patient has not attended therapy since 10/6/2022    Goals: Short Term Goals: 4 weeks   Pt to demonstrate proper diaphragmatic breathing to help with calming the nervous system in order to decrease pain and to improve abdominal wall musculature extensibility. MET 9/8/2022  2. Pt to report being able to complete a half day at work without significant increase in pain to demonstrate a return towards prior level of function. MET 9/8/2022  Pt to demonstrate good body mechanics when performing ADLs such as lifting and bending to decrease strain to lumbopelvic structures and reduce risk of further injury. PC        Long Term Goals: 12 weeks   Pt to be discharged with home plan for carry over after discharge. PC  Pt will be trained and compliant with postural strategies in sitting and standing to improve alignment and decrease pain and muscle fatigue. PC  Pt to report being able to complete a full day at work without significant increase in pain to demonstrate a return towards prior level of function. PC  Pt to report being able to have a comfortable vaginal exam without significant increase in pelvic pain. PC     PC= progressing/continue     PLAN   This patient is discharged from Physical Therapy      Marquita Kyle, PT